# Patient Record
Sex: MALE | Race: BLACK OR AFRICAN AMERICAN | Employment: FULL TIME | URBAN - METROPOLITAN AREA
[De-identification: names, ages, dates, MRNs, and addresses within clinical notes are randomized per-mention and may not be internally consistent; named-entity substitution may affect disease eponyms.]

---

## 2021-08-26 ENCOUNTER — OFFICE VISIT (OUTPATIENT)
Dept: ORTHOPEDIC SURGERY | Age: 36
End: 2021-08-26
Payer: COMMERCIAL

## 2021-08-26 VITALS
OXYGEN SATURATION: 98 % | HEIGHT: 72 IN | BODY MASS INDEX: 37.22 KG/M2 | TEMPERATURE: 97.7 F | HEART RATE: 80 BPM | WEIGHT: 274.8 LBS

## 2021-08-26 DIAGNOSIS — S63.502A LEFT WRIST SPRAIN, INITIAL ENCOUNTER: Primary | ICD-10-CM

## 2021-08-26 DIAGNOSIS — M77.8 TRICEPS TENDONITIS: ICD-10-CM

## 2021-08-26 DIAGNOSIS — S63.501A RIGHT WRIST SPRAIN, INITIAL ENCOUNTER: ICD-10-CM

## 2021-08-26 PROCEDURE — 73080 X-RAY EXAM OF ELBOW: CPT | Performed by: ORTHOPAEDIC SURGERY

## 2021-08-26 PROCEDURE — 73110 X-RAY EXAM OF WRIST: CPT | Performed by: ORTHOPAEDIC SURGERY

## 2021-08-26 PROCEDURE — 99203 OFFICE O/P NEW LOW 30 MIN: CPT | Performed by: ORTHOPAEDIC SURGERY

## 2021-08-26 NOTE — PROGRESS NOTES
Earl Madison is a 39 y.o. male right handed indeterminate. Worker's Compensation and legal considerations: none filed. Vitals:    08/26/21 0756   Pulse: 80   Temp: 97.7 °F (36.5 °C)   TempSrc: Skin   SpO2: 98%   Weight: 274 lb 12.8 oz (124.6 kg)   Height: 6' (1.829 m)   PainSc:   2   PainLoc: Wrist           Chief Complaint   Patient presents with    Elbow Pain     right    Wrist Pain     bilateral         HPI: Patient presents today with complaints of bilateral wrist pain left worse than right and right elbow pain. He reports these are exacerbated when lifting weights. Date of onset: Early 2021    Injury: No    Prior Treatment:  No    Numbness/ Tingling: No      ROS: Review of Systems - General ROS: negative  Psychological ROS: negative  ENT ROS: negative  Allergy and Immunology ROS: negative  Hematological and Lymphatic ROS: negative  Respiratory ROS: no cough, shortness of breath, or wheezing  Cardiovascular ROS: no chest pain or dyspnea on exertion  Gastrointestinal ROS: no abdominal pain, change in bowel habits, or black or bloody stools  Musculoskeletal ROS: negative  Neurological ROS: negative  Dermatological ROS: negative    Past Medical History:   Diagnosis Date    Azoospermia     Benign hypertension     Bilateral wrist pain     Hypertension 2010    Hypogonadism in male     Ill-defined condition     back injury May 30, 2015    [facility] 8-    BMI 56.9    Right elbow pain     Vitamin D deficiency        Past Surgical History:   Procedure Laterality Date    HX GASTRECTOMY      HX ORTHOPAEDIC Left 2013    ring finger    HX ORTHOPAEDIC      herniated disk        Current Outpatient Medications   Medication Sig Dispense Refill    clomiPHENE (CLOMID) 50 mg tablet 1 TABLET BY MOUTH DAILY      chorionic gonadotropin, human (NovareL) 5,000 unit solr 5,000 Units by IntraMUSCular route two (2) times a week.       hydrochlorothiazide (HYDRODIURIL) 25 mg tablet Take 25 mg by mouth daily. Pt instructed to take am of surgery. Indications: HYPERTENSION         No Known Allergies        PE:     Physical Exam  Vitals and nursing note reviewed. Constitutional:       General: He is not in acute distress. Appearance: Normal appearance. He is not ill-appearing. Cardiovascular:      Pulses: Normal pulses. Pulmonary:      Effort: Pulmonary effort is normal. No respiratory distress. Musculoskeletal:         General: Tenderness present. No swelling, deformity or signs of injury. Normal range of motion. Cervical back: Normal range of motion and neck supple. Right lower leg: No edema. Left lower leg: No edema. Skin:     General: Skin is warm and dry. Capillary Refill: Capillary refill takes less than 2 seconds. Findings: No bruising or erythema. Neurological:      General: No focal deficit present. Mental Status: He is alert and oriented to person, place, and time. Psychiatric:         Mood and Affect: Mood normal.         Behavior: Behavior normal.          Right elbow: There is tenderness to palpation along the triceps insertion dorsally. This is exacerbated by active resisted motion. Wrist:    Tenderness L R Test L R   1st Ext Comp - - Finkelstein's - -   Snuff Box - - Bailey - -   2nd Ext Comp - - S-L Shear - -   S-L Joint - - L-T Shear - -   L-T Joint - - DRUJ Sup + +   6th Ext Comp - - DRUJ Pro + +   Ulnar Snuff + + DRUJ Grind - -   Fovea - - TFCC - -   STT Joint - - Mid-Carp Inst - -   FCR - - P-T Grind - -   Intersection - - ECU Sublux. - -      Dorsal Ganglion: -   Volar Ganglion: -      ROM: Full        Imagin2021 3 views of bilateral wrist does not show any fracture dislocation or any other osseous abnormalities. 21 3 views of right elbow negative for fracture, dislocation, or any other osseous abnormalities. ICD-10-CM ICD-9-CM    1.  Left wrist sprain, initial encounter  S63.502A 842.00 AMB POC XRAY, WRIST; COMPLETE, 3+ VIE      AMB SUPPLY ORDER      MRI WRIST LT WO CONT   2. Right wrist sprain, initial encounter  S63.501A 842.00 AMB POC XRAY, WRIST; COMPLETE, 3+ VIE      AMB SUPPLY ORDER   3. Triceps tendonitis  M77.8 726.39 AMB POC XRAY, ELBOW; COMPLETE, 3+ VIE         Plan:     MRI of left wrist without contrast to rule out soft tissue injury or other internal derangement. Bilateral wrist braces to be worn whenever active especially during weightlifting. Instructed the patient in the importance of rest that he is likely suffering from some overuse at his right elbow and bilateral wrists. Follow-up and Dispositions    · Return for MRI review.           Plan was reviewed with patient, who verbalized agreement and understanding of the plan

## 2021-09-30 ENCOUNTER — HOSPITAL ENCOUNTER (OUTPATIENT)
Dept: MRI IMAGING | Age: 36
Discharge: HOME | End: 2021-09-30
Attending: ORTHOPAEDIC SURGERY
Payer: COMMERCIAL

## 2021-09-30 DIAGNOSIS — S63.502A LEFT WRIST SPRAIN, INITIAL ENCOUNTER: ICD-10-CM

## 2021-09-30 PROCEDURE — 73221 MRI JOINT UPR EXTREM W/O DYE: CPT

## 2021-10-06 PROBLEM — B37.2 CANDIDAL INTERTRIGO: Status: ACTIVE | Noted: 2018-03-11

## 2021-10-06 PROBLEM — Z90.3 HISTORY OF SLEEVE GASTRECTOMY: Status: ACTIVE | Noted: 2017-01-20

## 2021-10-06 PROBLEM — I10 BENIGN HYPERTENSION: Status: ACTIVE | Noted: 2020-01-09

## 2021-10-06 PROBLEM — E65: Status: ACTIVE | Noted: 2018-03-09

## 2021-10-06 PROBLEM — E66.9 CLASS 2 OBESITY WITHOUT SERIOUS COMORBIDITY WITH BODY MASS INDEX (BMI) OF 35.0 TO 35.9 IN ADULT: Status: ACTIVE | Noted: 2020-01-09

## 2021-10-21 ENCOUNTER — OFFICE VISIT (OUTPATIENT)
Dept: ORTHOPEDIC SURGERY | Age: 36
End: 2021-10-21
Payer: COMMERCIAL

## 2021-10-21 VITALS
TEMPERATURE: 97.7 F | WEIGHT: 274 LBS | BODY MASS INDEX: 38.36 KG/M2 | HEIGHT: 71 IN | HEART RATE: 74 BPM | OXYGEN SATURATION: 97 %

## 2021-10-21 DIAGNOSIS — S63.502D LEFT WRIST SPRAIN, SUBSEQUENT ENCOUNTER: Primary | ICD-10-CM

## 2021-10-21 DIAGNOSIS — S63.501D RIGHT WRIST SPRAIN, SUBSEQUENT ENCOUNTER: ICD-10-CM

## 2021-10-21 PROCEDURE — 99214 OFFICE O/P EST MOD 30 MIN: CPT | Performed by: ORTHOPAEDIC SURGERY

## 2021-10-21 NOTE — PROGRESS NOTES
Melinda Ramires is a 39 y.o. male right handed indeterminate. Worker's Compensation and legal considerations: none filed. Vitals:    10/21/21 0908   Pulse: 74   Temp: 97.7 °F (36.5 °C)   TempSrc: Temporal   SpO2: 97%   Weight: 274 lb (124.3 kg)   Height: 5' 11\" (1.803 m)   PainSc:   0 - No pain   PainLoc: Wrist           Chief Complaint   Patient presents with    Wrist Pain     left wrist       HPI: Patient presents today for MRI follow-up. He reports his pain is significantly improved if not completely resolved since he is taken some time off from the gym. Initial HPI: Patient presents today with complaints of bilateral wrist pain left worse than right and right elbow pain. He reports these are exacerbated when lifting weights. Date of onset: Early 2021    Injury: No    Prior Treatment:  No    Numbness/ Tingling: No      ROS: Review of Systems - General ROS: negative  Psychological ROS: negative  ENT ROS: negative  Allergy and Immunology ROS: negative  Hematological and Lymphatic ROS: negative  Respiratory ROS: no cough, shortness of breath, or wheezing  Cardiovascular ROS: no chest pain or dyspnea on exertion  Gastrointestinal ROS: no abdominal pain, change in bowel habits, or black or bloody stools  Musculoskeletal ROS: negative  Neurological ROS: negative  Dermatological ROS: negative    Past Medical History:   Diagnosis Date    Azoospermia     Benign hypertension     Bilateral wrist pain     Hypertension 2010    Hypogonadism in male     Ill-defined condition     back injury May 30, 2015    [facility]bid obesity ([facility] 8-    BMI 56.9    Right elbow pain     Vitamin D deficiency        Past Surgical History:   Procedure Laterality Date    HX GASTRECTOMY      HX ORTHOPAEDIC Left 2013    ring finger    HX ORTHOPAEDIC      herniated disk        Current Outpatient Medications   Medication Sig Dispense Refill    hydrochlorothiazide (HYDRODIURIL) 25 mg tablet Take 25 mg by mouth daily.  Pt instructed to take am of surgery. Indications: HYPERTENSION         No Known Allergies        PE:     Physical Exam  Vitals and nursing note reviewed. Constitutional:       General: He is not in acute distress. Appearance: Normal appearance. He is not ill-appearing. Cardiovascular:      Pulses: Normal pulses. Pulmonary:      Effort: Pulmonary effort is normal. No respiratory distress. Musculoskeletal:         General: No swelling, tenderness, deformity or signs of injury. Normal range of motion. Cervical back: Normal range of motion and neck supple. Right lower leg: No edema. Left lower leg: No edema. Skin:     General: Skin is warm and dry. Capillary Refill: Capillary refill takes less than 2 seconds. Findings: No bruising or erythema. Neurological:      General: No focal deficit present. Mental Status: He is alert and oriented to person, place, and time. Psychiatric:         Mood and Affect: Mood normal.         Behavior: Behavior normal.          Right elbow: No tenderness to palpation today. Wrist:    Tenderness L R Test L R   1st Ext Comp - - Finkelstein's - -   Snuff Box - - Bailey - -   2nd Ext Comp - - S-L Shear - -   S-L Joint - - L-T Shear - -   L-T Joint - - DRUJ Sup - -   6th Ext Comp - - DRUJ Pro - -   Ulnar Snuff - - DRUJ Grind - -   Fovea - - TFCC - -   STT Joint - - Mid-Carp Inst - -   FCR - - P-T Grind - -   Intersection - - ECU Sublux. - -      Dorsal Ganglion: -   Volar Ganglion: -      ROM: Full        Imaging:       MRI of left wrist without contrast    IMPRESSION  1. No evidence of fracture, stress fracture, or bone marrow stress reaction  involving the left wrist.     2. MRI findings in keeping with a small slitlike perforation of the TFC, the  imaging appearance of which favors a Simmons 1A tear morphology. > Mild lateral subluxation of the ECU from the ulnar styloid recess noted,  potentially related to subsheath rupture.      > Moderate size joint effusion/synovitis at the distal radial ulnar joint. 8/26/2021 3 views of bilateral wrist does not show any fracture dislocation or any other osseous abnormalities. 8/26/21 3 views of right elbow negative for fracture, dislocation, or any other osseous abnormalities. ICD-10-CM ICD-9-CM    1. Left wrist sprain, subsequent encounter  S63.502D V58.89      842.00    2. Right wrist sprain, subsequent encounter  S63.501D V58.89      842.00          Plan:     As his symptoms have almost completely resolved with rest I have told him to just be mindful of his activities in the gym and possibly to get a wrist guard whenever lifting heavy weights. Follow-up and Dispositions    · Return if symptoms worsen or fail to improve.           Plan was reviewed with patient, who verbalized agreement and understanding of the plan

## 2021-12-15 ENCOUNTER — OFFICE VISIT (OUTPATIENT)
Dept: ORTHOPEDIC SURGERY | Age: 36
End: 2021-12-15
Payer: COMMERCIAL

## 2021-12-15 VITALS
HEART RATE: 66 BPM | TEMPERATURE: 97.8 F | WEIGHT: 286 LBS | OXYGEN SATURATION: 99 % | BODY MASS INDEX: 40.04 KG/M2 | HEIGHT: 71 IN

## 2021-12-15 DIAGNOSIS — M25.521 ELBOW PAIN, RIGHT: ICD-10-CM

## 2021-12-15 DIAGNOSIS — M75.52 CHRONIC BURSITIS OF LEFT SHOULDER: ICD-10-CM

## 2021-12-15 DIAGNOSIS — M25.512 CHRONIC LEFT SHOULDER PAIN: ICD-10-CM

## 2021-12-15 DIAGNOSIS — M19.011 PRIMARY OSTEOARTHRITIS, RIGHT SHOULDER: ICD-10-CM

## 2021-12-15 DIAGNOSIS — G89.29 CHRONIC LEFT SHOULDER PAIN: ICD-10-CM

## 2021-12-15 DIAGNOSIS — M25.511 CHRONIC RIGHT SHOULDER PAIN: Primary | ICD-10-CM

## 2021-12-15 DIAGNOSIS — M70.21 OLECRANON BURSITIS OF RIGHT ELBOW: ICD-10-CM

## 2021-12-15 DIAGNOSIS — M75.51 CHRONIC BURSITIS OF RIGHT SHOULDER: ICD-10-CM

## 2021-12-15 DIAGNOSIS — G89.29 CHRONIC RIGHT SHOULDER PAIN: Primary | ICD-10-CM

## 2021-12-15 PROCEDURE — 99214 OFFICE O/P EST MOD 30 MIN: CPT | Performed by: SPECIALIST

## 2021-12-15 PROCEDURE — 73030 X-RAY EXAM OF SHOULDER: CPT | Performed by: SPECIALIST

## 2021-12-15 RX ORDER — DICLOFENAC SODIUM 10 MG/G
4 GEL TOPICAL 4 TIMES DAILY
Qty: 5 EACH | Refills: 5 | Status: SHIPPED | OUTPATIENT
Start: 2021-12-15

## 2021-12-15 NOTE — PROGRESS NOTES
Patient: Addie Painting                MRN: [id number]       SSN: [id number]  YOB: 1985        AGE: 39 y.o. SEX: male    PCP: Jasson Elaine MD  12/15/21    CC: BILATERAL SHOULDER AND RIGHT ELBOW PAIN    HISTORY:  Addie Painting is a 39 y.o. male who is seen for bilateral shoulder and right elbow pain. He has been experiencing shoulder R>L pain for the past several months. He does not recall any injury. He notes increased shoulder pain when lifting weights. He experiences pain especially with bench press. He bench presses 290 pounds. He had to stop lifting recently due to severe pain. He feels shoulder pain with overhead activities and at night. He is right handed. He is also seen for right elbow pain. He does not recall any injury. He has lateral elbow pain with lifting and gripping. Pain Assessment  12/15/2021   Location of Pain Elbow; Shoulder   Location Modifiers Right;Left   Severity of Pain 2   Quality of Pain Sharp; Aching   Duration of Pain -   Duration of Pain Comment -   Frequency of Pain Intermittent   Frequency of Pain Comment -   Date Pain First Started (No Data)   Date Pain First Started Comment 5 months ago   Aggravating Factors Bending;Exercise   Aggravating Factors Comment -   Limiting Behavior Yes   Relieving Factors Nothing   Result of Injury No     Occupation, etc:  Mr. Herber Zavaleta works as a  for the [employer]. He works remotely. He lives with his wife in [address] [place] [address]. Mr. Herber Zavaleta weighs 286 lbs and is 5'11\" tall.        No results found for: HBA1C, FXK1ALYV, GIL7YATI, ZLS2TOTT  Weight Metrics 12/15/2021 10/21/2021 10/6/2021 8/26/2021 7/27/2021 8/26/2015 8/13/2015   Weight 286 lb 274 lb 270 lb 274 lb 12.8 oz 265 lb 410 lb 12.8 oz 419 lb   BMI 39.89 kg/m2 38.22 kg/m2 37.66 kg/m2 37.27 kg/m2 35.94 kg/m2 55.7 kg/m2 56.81 kg/m2       Patient Active Problem List   Diagnosis Code    Vitamin D deficiency E55.9    Panniculus adiposus E65    History of sleeve gastrectomy Z90.3    Class 2 obesity without serious comorbidity with body mass index (BMI) of 35.0 to 35.9 in adult E66.9, Z68.35    Benign hypertension I10    Candidal intertrigo B37.2     REVIEW OF SYSTEMS:    Constitutional Symptoms: Negative   Eyes: Negative   Ears, Nose, Throat and Mouth: Negative   Cardiovascular: Negative   Respiratory: Negative   Genitourinary: Per HPI   Gastrointestinal: Per HPI   Integumentary (Skin and/or Breast): Negative   Musculoskeletal: Per HPI   Endocrine/Rheumatologic: Negative   Neurological: Per HPI   Hematology/Lymphatic: Negative    Allergic/Immunologic: Negative   Phychiatric: Negative    Social History     Socioeconomic History    Marital status:      Spouse name: Not on file    Number of children: Not on file    Years of education: Not on file    Highest education level: Not on file   Occupational History    Not on file   Tobacco Use    Smoking status: Former Smoker     Quit date: 12/26/2017     Years since quitting: 3.9    Smokeless tobacco: Never Used   Vaping Use    Vaping Use: Not on file   Substance and Sexual Activity    Alcohol use: Yes     Alcohol/week: 6.0 standard drinks     Types: 6 Cans of beer per week    Drug use: No    Sexual activity: Never   Other Topics Concern    Not on file   Social History Narrative    Not on file     Social Determinants of Health     Financial Resource Strain:     Difficulty of Paying Living Expenses: Not on file   Food Insecurity:     Worried About Running Out of Food in the Last Year: Not on file    [place] of Food in the Last Year: Not on file   Transportation Needs:     Lack of Transportation (Medical): Not on file    Lack of Transportation (Non-Medical):  Not on file   Physical Activity:     Days of Exercise per Week: Not on file    Minutes of Exercise per Session: Not on file   Stress:     Feeling of Stress : Not on file   Social Connections:     Frequency of Communication with Friends and Family: Not on file    Frequency of Social Gatherings with Friends and Family: Not on file    Attends Buddhism Services: Not on file    Active Member of Clubs or Organizations: Not on file    Attends Club or Organization Meetings: Not on file    Marital Status: Not on file   Intimate Partner Violence:     Fear of Current or Ex-Partner: Not on file    Emotionally Abused: Not on file    Physically Abused: Not on file    Sexually Abused: Not on file   Housing Stability:     Unable to Pay for Housing in the Last Year: Not on file    Number of [place] in the Last Year: Not on file    Unstable Housing in the Last Year: Not on file      No Known Allergies   Current Outpatient Medications   Medication Sig    diclofenac (VOLTAREN) 1 % gel Apply 4 g to affected area four (4) times daily.  hydrochlorothiazide (HYDRODIURIL) 25 mg tablet Take 25 mg by mouth daily. Pt instructed to take am of surgery. Indications: HYPERTENSION     No current facility-administered medications for this visit. PHYSICAL EXAMINATION:  Visit Vitals  Pulse 66   Temp 97.8 °F (36.6 °C) (Temporal)   Ht 5' 11\" (1.803 m)   Wt 286 lb (129.7 kg)   SpO2 99%   BMI 39.89 kg/m²    Appearance: Alert, well appearing, obese, mesomorphic pleasant patient who is in no distress, oriented to person, place/time, and who follows commands. HEENT: [facility] hears well, does not require hearing aids. His sclera of the eyes are non-icteric. He is breathing normally and no respiratory accessory muscle use is noted. No JVD present and Neck ROM within normal limits. Psychiatric: Affect and mood are appropriate. Oriented x3  Cardiovascular/Peripheral Vascular: Normal pulses to each foot. Integumentary: No rashes. Warm and normal color. No drainage.    Gait: normal  Sensory Exam: Intact/Normal Sensation    Lymphatic: No evidence of Lymphedema  Vascular:       Pulses: palpable  Varicosities none  Wounds/Abrasion: None Present  Neuro: Negative, no tremors  ORTHO EXAMINATION:  Examination Right shoulder Left shoulder   Skin Intact Intact   Effusion - -   Biceps deformity - -   Atrophy - -   AC joint tenderness + -   Acromial tenderness - -   Biceps tenderness + -   Forward flexion/Elevation  170   Active abduction  170   External rotation ROM 10 30   Internal rotation  100   Apprehension - -   Impingement - -   Drop Arm Test - -   Neurovascular Intact Intact      Examination Right Elbow Left Elbow   Skin Intact Intact   Range of Motion 135-0 135-0   Tenderness + olecranon -   Swelling - -   Bruising - -   Stability Normal Normal   Motor Strength  Normal Normal   Neurovascular Intact Intact       MRI LEFT WRIST 9/30/21  IMPRESSION  1. No evidence of fracture, stress fracture, or bone marrow stress reaction involving the left wrist.   2. MRI findings in keeping with a small slitlike perforation of the TFC, the  imaging appearance of which favors a Simmons 1A tear morphology. > Mild lateral subluxation of the ECU from the ulnar styloid recess noted,  potentially related to subsheath rupture.     > Moderate size joint effusion/synovitis at the distal radial ulnar joint. RADIOGRAPHS:  XR BILAT SHOULDER 12/15/21 FRANNY  IMPRESSION:  Three views - No fractures, right moderate acromioclavicular narrowing, no glenohumeral narrowing, no calcific densities. IMPRESSION:      ICD-10-CM ICD-9-CM    1. Chronic right shoulder pain  M25.511 719.41 AMB POC XRAY, SHOULDER; COMPLETE, 2+    G89.29 338.29 diclofenac (VOLTAREN) 1 % gel      REFERRAL TO PHYSICAL THERAPY   2.  Chronic bursitis of right shoulder  M75.51 726.10 diclofenac (VOLTAREN) 1 % gel      REFERRAL TO PHYSICAL THERAPY   3. Primary osteoarthritis, right shoulder  M19.011 715.11 diclofenac (VOLTAREN) 1 % gel      REFERRAL TO PHYSICAL THERAPY   4. Chronic bursitis of left shoulder  M75.52 726.10 diclofenac (VOLTAREN) 1 % gel      REFERRAL TO PHYSICAL THERAPY 5. Chronic left shoulder pain  M25.512 719.41 AMB POC XRAY, SHOULDER; COMPLETE, 2+    G89.29 338.29 diclofenac (VOLTAREN) 1 % gel      REFERRAL TO PHYSICAL THERAPY   6. Olecranon bursitis of right elbow  M70.21 726.33 diclofenac (VOLTAREN) 1 % gel      REFERRAL TO PHYSICAL THERAPY   7. Elbow pain, right  M25.521 719.42 diclofenac (VOLTAREN) 1 % gel      REFERRAL TO PHYSICAL THERAPY     PLAN:  There is no need for surgery or injection at this time. Voltaren Gel Rx provided. He will start a brief course of outpatient physical therapy. We discussed the possibility of a right shoulder MR arthrogram at some point  in the future if pain continues. He will follow up as needed.       Scribed by Harsh Sevilla MD [facility]) as dictated by Harsh Sevilla MD

## 2021-12-20 ENCOUNTER — TELEPHONE (OUTPATIENT)
Dept: ORTHOPEDIC SURGERY | Age: 36
End: 2021-12-20

## 2021-12-20 NOTE — TELEPHONE ENCOUNTER
[facility] - Kunal Zamudio called requesting a referral be faxed to them for this patient, but the referral in the system is closed. We can fax a new referral to them at fax number [phone]. Please advise.

## 2021-12-21 ENCOUNTER — HOSPITAL ENCOUNTER (OUTPATIENT)
Dept: PHYSICAL THERAPY | Age: 36
Discharge: HOME | End: 2021-12-21
Payer: COMMERCIAL

## 2021-12-21 PROCEDURE — 97110 THERAPEUTIC EXERCISES: CPT

## 2021-12-21 PROCEDURE — 97161 PT EVAL LOW COMPLEX 20 MIN: CPT

## 2021-12-21 NOTE — PROGRESS NOTES
PT DAILY TREATMENT NOTE/SHOULDER EVAL 10-18    Patient Name: Timmy Bueno  Date:2021  : 1985  [x]  Patient  Verified  Payor: Sushma Clements / Plan: Cloretta Reges PPO / Product Type: PPO /    In time:10:15  Out time:11:05  Total Treatment Time (min): 50  Visit #: 1 of 12    Medicare/BCBS Only   Total Timed Codes (min):  23 1:1 Treatment Time:  50       Treatment Area: Right shoulder pain [M25.511]  Left shoulder pain [M25.512]    SUBJECTIVE  Pain Level (0-10 scale): 1/10  [x]constant []intermittent [x]improving []worsening []no change since onset  Any medication changes, allergies to medications, adverse drug reactions, diagnosis change, or new procedure performed?: [x] No    [] Yes (see summary sheet for update)  Subjective functional status/changes:   HPI: Pt reports his right and left shoulders started hurting month ago. Pt reports he hasn't lifted anything in a month. Pt reports he started to increase the load over the last few months. Pt the bench press with dumbbells and overhead press single handed anything over 75 lbs. Pt reports he doesn't have pain unless he does that lifting. Pt's last day of lifting was . Pt reports the right hurts more than the left and pt is right handed. Pt reports the right elbow has also been hurting and that hurts with curls and pushing overhead activities, but once he gets warmed up it is okay but then after it hurts. Pain description:    [x]Constant []Intermittent   Limitations to PLOF: unable to perform the weight lifting he wants to do at the weight he wants to do it at. Mechanism of Injury: weight training, progression. Current symptoms/Complaints: 1/10 at best with rest; 10/10 at worst with lifting single handed lifting over head or bench press dumbbells, move it certain ways; pt reports they are able to sleep through the night secondary to pain  Pain since onset: [x]Better- due to not lifting for a month.    Previous Treatment/Compliance: none, just X-rays. Comorbidities/PMHx/Surgical Hx: retrains water and takes a water pill for that. Prior level of function: functionally independent, no AD, active lifestyle, goes to gym at least 4 times a week. 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 Living Situation: lives with wife. Pt Goals: \"to be able to cope with this and be able to get back to lifting \"  Barriers: []pain []financial []time []transportation []other  Motivation: very motivated       OBJECTIVE      27 min [x]Eval                  []Re-Eval       23 min Therapeutic Exercise:  [] See flow sheet :   Rationale: increase strength, improve coordination and increase proprioception to improve the patients ability to To increase patient's ease with performing functional activities and decrease overall pain and symptoms. With   [x] TE   [] TA   [] neuro   [] other: Patient Education: [x] Review HEP    [] Progressed/Changed HEP based on:   [] positioning   [] body mechanics   [] transfers   [] heat/ice application    [] other:        General Evaluation  Observation: Pt enters gym in no apparent distress. Posture:  pt with increased thoracic kyphosis and forward head posture. Palpation/Sensation: pt was not tender to palpation over bilateral shoulders or right elbow/olecranon. ROM:(degrees)               AROM    PROM   Shoulder Left Right Left Right   Flexion 165 170     Extension nt nt      175     ER T3 T4     IR T8 T8               Strength (MMT):5/5 with exceptions indicated below.    Shoulder L (1-5) R (1-5)   Shoulder Flexion 5- 5-   Shoulder Ext     Shoulder ABD     Shoulder ADD     Shoulder IR     Shoulder ER 5- 5-   Elbow Flexion     Elbow Extension     Wrist Flexion     Wrist Extension                 Parascapular L (1-5) R (1-5)   Lower trap 3+ pain 3+ pain   Middle trap 3+ pain 3+   Upper trap 5 4+                         Special Tests:  Shoulder SAPS cluster Left Right   Medina-Attila + +   Neer - -   Empty Can - -   Painful arch + +   Resisted external rotation + +     Other special tests:  Lateral epicondylitis tests: negative bilaterally. Grind/clank/clunk: negative for clicking, catching or popping    Functional Mobility      Bed Mobility:      Scooting:independent        Rolling:independent       Sit-Supine:independent      Transfers:       Sit-Stand:independent       Floor-Stand: independent      Other Tests / Comments: Pt was educated to watch how he was sitting in his chair to not rest his right elbow on it. Sensation: intact to light touch       Pain Level (0-10 scale) post treatment: 1/10    ASSESSMENT/Changes in Function: Patient is a 40 yo male who [facility] to [facility] with c/o bilateral shoulder pain and right elbow pain. Patient s/s are consistent with bilateral rotator cuff pain syndrome. Rotator cuff irritation was most likely caused by high weight overhead lifting with decreased parascapular stability. Patient also most likely had right triceps tendonitis, which has calmed down at this time due to patient not lifting for a month. Patient's right elbow pain could also be positional as patient props his elbows up on his chair at his desk. Patient demonstrates decreased ROM, decreased strength, decreased parascapular stability, impaired posture and pain which limites ease with overhead lifting activities. Patient would benefit form skilled physical therapy to address the above limitations and progress back to prior level of function. Patient will continue to benefit from skilled PT services to modify and progress therapeutic interventions, address functional mobility deficits, address ROM deficits, address strength deficits, analyze and address soft tissue restrictions, analyze and cue movement patterns, analyze and modify body mechanics/ergonomics and assess and modify postural abnormalities to attain remaining goals.      [x]  See Plan of Care  []  See progress note/recertification  []  See Discharge Summary    Evaluation Complexity History MEDIUM  Complexity : 1-2 comorbidities / personal factors will impact the outcome/ POC ; Examination MEDIUM Complexity : 3 Standardized tests and measures addressing body structure, function, activity limitation and / or participation in recreation  ;Presentation MEDIUM Complexity : Evolving with changing characteristics  ; Clinical Decision Making LOW Complexity : FOTO score of   Overall Complexity Rating: LOW   Problem List: pain affecting function, decrease ROM, decrease strength, decrease ADL/ functional abilitiies and decrease activity tolerance   Treatment Plan may include any combination of the following: Therapeutic exercise, Therapeutic activities, Neuromuscular re-education, Manual therapy, Patient education, Self Care training and Functional mobility training  Patient / Family readiness to learn indicated by: asking questions, trying to perform skills and interest  Persons(s) to be included in education: patient (P)  Barriers to Learning/Limitations: None  Patient Goal (s): to be able to cope with this and be able to get back to lifting  Patient Self Reported Health Status: good  Rehabilitation Potential: good  Progress towards goals / Updated goals:         Progress towards goals / Updated goals:  Short Term Goals: To be accomplished in 2 weeks: 1. Patient will report compliance with HEP at least 1x/day to aid in rehabilitation program.   Status at IE: pt was given at evaluation. Current: Same as IE      Long Term Goals: To be accomplished in 6 weeks: 1. Patient will report compliance with HEP a least 3-4x/week to aid in rehabilitation/strengthening program.   Status at IE: pt was given at evaluation. Current: Same as IE    2. Patient will report no pain greater than 1-2/10 with overhead lifting activities in order to progress back to prior level of function.     Status at IE: pain at worst 10/10 with lifting activities. Current: Same as IE    3. Patient will increase flower. UE strength to at least 4/5 throughout all planes to aid in stabilization with overhead lifting activities. Status at IE:     Parascapular L (1-5) R (1-5)   Lower trap 3+ pain 3+ pain   Middle trap 3+ pain 3+   Upper trap 5 4+      Current: Same as IE    4. Patient will increase FOTO score to 84 points overall to demonstrate improvement in functional status.     Status at IE: 80   Current: Same as IE     *FOTO score is an established functional score where 100 = no disability*         PLAN  [x]  Upgrade activities as tolerated     [x]  Continue plan of care  [x]  Update interventions per flow sheet       []  Discharge due to:_  []  Other:_      Brandy Guardado, PT 12/21/2021  10:03 AM

## 2021-12-21 NOTE — PROGRESS NOTES
[facility]  [address], [address] [facility] [place]  Ph [phone]  Fx [phone]    Plan of Care/ Statement of Necessity for Physical Therapy Services    Patient name: Felipa Betancur [facility] of Care: 2021   Referral source: Katy Buchanan MD : 1985    Medical Diagnosis: Right shoulder pain [M25.511]  Left shoulder pain [M25.512]   Onset Date:10/21   Treatment Diagnosis: right and left shoulder pain, right elbow pain. ICD-10: Z73.518, M25.512   Prior Hospitalization: see medical history Provider#: [id number]   Medications: Verified on Patient summary List   Comorbidities/PMHx/Surgical Hx: retrains water and takes a water pill for that. Prior level of function: functionally independent, no AD, active lifestyle, goes to gym at least 4 times a week. The Plan of Care and following information is based on the information from the initial evaluation. Assessment/ key information: Patient is a 40 yo male who presents to [facility] with c/o bilateral shoulder pain and right elbow pain. Patient s/s are consistent with bilateral rotator cuff pain syndrome. Rotator cuff irritation was most likely caused by high weight overhead lifting with decreased parascapular stability. Patient also most likely had right triceps tendonitis, which has calmed down at this time due to patient not lifting for a month. Patient's right elbow pain could also be positional as patient props his elbows up on his chair at his desk. Patient demonstrates decreased ROM, decreased strength, decreased parascapular stability, impaired posture and pain which limites ease with overhead lifting activities.  Patient would benefit form skilled physical therapy to address the above limitations and progress back to prior level of function.          Evaluation Complexity History MEDIUM  Complexity : 1-2 comorbidities / personal factors will impact the outcome/ POC ; Examination MEDIUM Complexity : 3 Standardized tests and measures addressing body structure, function, activity limitation and / or participation in recreation  ;Presentation MEDIUM Complexity : Evolving with changing characteristics  ; Clinical Decision Making LOW Complexity : FOTO score of   Overall Complexity Rating: LOW   Problem List: pain affecting function, decrease ROM, decrease strength, decrease ADL/ functional abilitiies and decrease activity tolerance       Treatment Plan may include any combination of the following: Therapeutic exercise, Therapeutic activities, Neuromuscular re-education, Manual therapy, Patient education, Self Care training and Functional mobility training  Patient / Family readiness to learn indicated by: asking questions, trying to perform skills and interest  Persons(s) to be included in education: patient (P)  Barriers to Learning/Limitations: None  Patient Goal (s): to be able to cope with this and be able to get back to lifting  Patient Self Reported Health Status: good  Rehabilitation Potential: good  Progress towards goals / Updated goals:         Progress towards goals / Updated goals:  Short Term Goals: To be accomplished in 2 weeks: 1. Patient will report compliance with HEP at least 1x/day to aid in rehabilitation program.              Status at IE: pt was given at evaluation. Current: Same as IE        Long Term Goals: To be accomplished in 6 weeks: 1. Patient will report compliance with HEP a least 3-4x/week to aid in rehabilitation/strengthening program.              Status at IE: pt was given at evaluation. Current: Same as IE     2.Patient will report no pain greater than 1-2/10 with overhead lifting activities in order to progress back to prior level of function. Status at IE: pain at worst 10/10 with lifting activities. Current: Same as IE     3. Patient will increase flower.  UE strength to at least 4/5 throughout all planes to aid in stabilization with overhead lifting activities. Status at IE:     Parascapular L (1-5) R (1-5)   Lower trap 3+ pain 3+ pain   Middle trap 3+ pain 3+   Upper trap 5 4+                  Current: Same as IE     4.Patient will increase FOTO score to 84 points overall to demonstrate improvement in functional status. Status at IE: 80              Current: Same as IE                 *FOTO score is an established functional score where 100 = no disability*           Frequency / Duration: Patient to be seen 2 times per week for 6 weeks. Patient/ Caregiver education and instruction: Diagnosis, prognosis, activity modification and exercises   [x]  Plan of care has been reviewed with PTA    Certification Period: SCAR Burgos, PT 12/21/2021 2:25 PM    ________________________________________________________________________    I certify that the above Therapy Services are being furnished while the patient is under my care. I agree with the treatment plan and certify that this therapy is necessary.     Physician's Signature:_____________________Date:____________TIME:________                                      Debi Kingsley MD      ** Signature, Date and Time must be completed for valid certification **  Please sign and return to [facility]  [address], [address] [facility]  Ph [phone]  Fx [phone]

## 2021-12-22 ENCOUNTER — APPOINTMENT (OUTPATIENT)
Dept: PHYSICAL THERAPY | Age: 36
End: 2021-12-22
Payer: COMMERCIAL

## 2021-12-23 ENCOUNTER — HOSPITAL ENCOUNTER (OUTPATIENT)
Dept: PHYSICAL THERAPY | Age: 36
Discharge: HOME | End: 2021-12-23
Payer: COMMERCIAL

## 2021-12-23 PROCEDURE — 97530 THERAPEUTIC ACTIVITIES: CPT

## 2021-12-23 PROCEDURE — 97110 THERAPEUTIC EXERCISES: CPT

## 2021-12-23 NOTE — PROGRESS NOTES
PT DAILY TREATMENT NOTE    Patient Name: Bg Abbott  Date:2021  : 1985  [x]  Patient  Verified  Payor: Usman Limb / Plan: Pelon Bright PPO / Product Type: PPO /    In time:758  Out time:842  Total Treatment Time (min): 44  Total Timed Codes (min): 40  1:1 Treatment Time (MC/BCBS only): 40  Visit #: 2  of 12    Treatment Dx: Right shoulder pain [M25.511]  Left shoulder pain [M25.512]    SUBJECTIVE  Pain Level (0-10 scale): 0/10  Any medication changes, allergies to medications, adverse drug reactions, diagnosis change, or new procedure performed?: [x] No    [] Yes (see summary sheet for update)  Subjective functional status/changes:   [] No changes reported  Patient reports no pain but has not tried to lift heavier weights 80 - 120#. OBJECTIVE      30 min Therapeutic Exercise:  [x] See flow sheet :   Rationale: increase ROM, increase strength and improve coordination to improve the patients ability to return to PLOF    10 min Therapeutic Activity:  [x]  See flow sheet :   Rationale: increase ROM, increase strength, improve coordination, improve balance and increase proprioception  to improve the patients ability to allow patient to perform activities with decreased discomfort. With   [x] TE   [] TA   [] neuro   [] other: Patient Education: [x] Review HEP    [] Progressed/Changed HEP based on:   [] positioning   [] body mechanics   [] transfers   [] heat/ice application    [] other:         Pain Level (0-10 scale) post treatment: 0/10    ASSESSMENT/Changes in Function: Patient tolerated treatment session well today. Patient had no complaints with addition of shoulder flexion, extension, rows,  chest press, and push ups while on BOSU to exercise program to accomplish increased core stability. Patient continues to make steady progress toward goals and would benefit from continued skilled PT intervention to address remaining deficits outlined in goals below.       Patient will continue to benefit from skilled PT services to modify and progress therapeutic interventions, address functional mobility deficits, address ROM deficits, address strength deficits, analyze and address soft tissue restrictions, analyze and cue movement patterns, analyze and modify body mechanics/ergonomics, assess and modify postural abnormalities and instruct in home and community integration to attain remaining goals. [x]  See Plan of Care  []  See progress note/recertification  []  See Discharge Summary         Progress towards goals / Updated goals:  Short Term Goals: To be accomplished in 2 weeks: 1. Patient will report compliance with HEP at least 1x/day to aid in rehabilitation program.              Status at IE: pt was given at evaluation. Current: Same as IE        Long Term Goals: To be accomplished in 6 weeks: 1. Patient will report compliance with HEP a least 3-4x/week to aid in rehabilitation/strengthening program.              Status at IE: pt was given at evaluation. Current: Same as IE     2.Patient will report no pain greater than 1-2/10 with overhead lifting activities in order to progress back to prior level of function. Status at IE: pain at worst 10/10 with lifting activities. Current: Same as IE                Current:  Pt reports no pain unless doing higher wt lifting 80 -120#  12/23/21     3. Patient will increase flower. UE strength to at least 4/5 throughout all planes to aid in stabilization with overhead lifting activities. Status at IE:     Parascapular L (1-5) R (1-5)   Lower trap 3+ pain 3+ pain   Middle trap 3+ pain 3+   Upper trap 5 4+                  Current: Same as IE     4.Patient will increase FOTO score to 84 points overall to demonstrate improvement in functional status.                Status at IE: 80              Current: Same as IE                 *FOTO score is an established functional score where 100 = no disability*               PLAN  []  Upgrade activities as tolerated     [x]  Continue plan of care  []  Update interventions per flow sheet       []  Discharge due to:_  []  Other:_      Guy Perez PT 12/23/2021  8:14 AM    Future Appointments   Date Time Provider Mandeep Petersen   1/4/2022  5:00 PM Deborrah Glow, [address] [facility]   1/6/2022  5:00 PM Deborrah Glow, [address] [facility]   1/11/2022  5:00 PM Deborrah Glow, [address] [facility]   1/13/2022  5:00 PM Deborrah Glow, PT [facility]   1/18/2022  5:00 PM Deborrah Glow, [address] [facility]   1/20/2022  5:00 PM Deborrah Glow, [address] [facility]   1/25/2022  5:00 PM Deborrah Glow, [address] [facility]   1/27/2022  5:00 PM Deborrah Glow, [address] [facility]

## 2022-01-04 ENCOUNTER — HOSPITAL ENCOUNTER (OUTPATIENT)
Dept: PHYSICAL THERAPY | Age: 37
Discharge: HOME | End: 2022-01-04
Payer: COMMERCIAL

## 2022-01-04 PROCEDURE — 97110 THERAPEUTIC EXERCISES: CPT

## 2022-01-04 PROCEDURE — 97530 THERAPEUTIC ACTIVITIES: CPT

## 2022-01-04 PROCEDURE — 97112 NEUROMUSCULAR REEDUCATION: CPT

## 2022-01-04 NOTE — PROGRESS NOTES
PT DAILY TREATMENT NOTE    Patient Name: Dalene Pallas  Date:2022  : 1985  [x]  Patient  Verified  Payor: Sarkis Kelsey / Plan: Orly Banks PPO / Product Type: PPO /    In time:505  Out time:554  Total Treatment Time (min): 49  Total Timed Codes (min): 49  1:1 Treatment Time (MC/BCBS only): 52   Visit #: 3 of 12    Treatment Dx: Right shoulder pain [M25.511]  Left shoulder pain [M25.512]    SUBJECTIVE  Pain Level (0-10 scale): 0/10  Any medication changes, allergies to medications, adverse drug reactions, diagnosis change, or new procedure performed?: [x] No    [] Yes (see summary sheet for update)  Subjective functional status/changes:   [] No changes reported  Pt reports that he will be attending to the gym tonight for the first time again. OBJECTIVE    15 min Therapeutic Exercise:  [x] See flow sheet :   Rationale: increase ROM, increase strength and improve coordination to improve the patients ability to restore PLOF    17 min Therapeutic Activity:  [x]  See flow sheet :   Rationale: increase ROM, increase strength, improve coordination and improve balance  to improve the patients ability to complete transfers and ADLs without external assist     17 min Neuromuscular Re-education:  [x]  See flow sheet :   Rationale: increase ROM, increase strength, improve coordination and improve balance  to improve the patients ability to             With   [x] TE   [x] TA   [x] neuro   [] other: Patient Education: [x] Review HEP    [x] Progressed/Changed HEP based on:   [x] positioning   [x] body mechanics   [] transfers   [] heat/ice application    [] other:         Pain Level (0-10 scale) post treatment: 0/10      ASSESSMENT/Changes in Function:    Patient tolerated treatment session well today. Patient had no complaints with addition of 10 lbs weight with exercise program to accomplish scapular strengthening and stabilization. Pt reports that he will be attending back to the [employer] tonight.  He reports of no pain post treatment today. PT gave ok for light weight for bench press, over head press and elbow curl tonight. Pt cont to need stabilization. Patient continues to make steady progress toward goals and would benefit from continued skilled PT intervention to address remaining deficits outlined in goals below. Patient will continue to benefit from skilled PT services to modify and progress therapeutic interventions, address functional mobility deficits, address ROM deficits, address strength deficits, analyze and address soft tissue restrictions, analyze and cue movement patterns, analyze and modify body mechanics/ergonomics and assess and modify postural abnormalities to attain remaining goals. [x]  See Plan of Care  []  See progress note/recertification  []  See Discharge Summary         Progress towards goals / Updated goals:    Short Term Goals: To be accomplished in 2 weeks: 1. Patient will report compliance with HEP at least 1x/day to aid in rehabilitation program.              Status at IE: pt was given at evaluation. Current: Same as IE 1/4/22        Long Term Goals: To be accomplished in 6 weeks: 1. Patient will report compliance with HEP a least 3-4x/week to aid in rehabilitation/strengthening program.              Status at IE: pt was given at evaluation. Current: Same as IE 1/4/22     2. Patient will report no pain greater than 1-2/10 with overhead lifting activities in order to progress back to prior level of function. Status at IE: pain at worst 10/10 with lifting activities. Current: Same as IE                Current:  Pt reports no pain unless doing higher wt lifting 80 -120#  12/23/21     3. Patient will increase flower. UE strength to at least 4/5 throughout all planes to aid in stabilization with overhead lifting activities.                Status at IE:     Parascapular L (1-5) R (1-5)   Lower trap 3+ pain 3+ pain   Middle trap 3+ pain 3+   Upper trap 5 4+                  Current: Same as IE   4. Patient will increase FOTO score to 84 points overall to demonstrate improvement in functional status.                Status at IE: 80              Current: Same as IE                 *FOTO score is an established functional score where 100 = no disability*             PLAN  []  Upgrade activities as tolerated     [x]  Continue plan of care  []  Update interventions per flow sheet       []  Discharge due to:_  []  Other:_      Josiane Canales, EMIGDIO 1/4/2022  4:56 PM    Signed By: Gertrudis Rodriguez, PT     January 4, 2022        Future Appointments   Date Time Provider Mandeep Petersen   1/4/2022  5:00 PM Tsohia Kedar, [address] [facility]   1/6/2022  5:00 PM Toshia Kedar, [address] [facility]   1/11/2022  5:00 PM Toshia Kedar, [address] [facility]   1/13/2022  5:00 PM Toshia Kedar, [address] [facility]   1/18/2022  5:00 PM Toshia Kedar, [address] [facility]   1/20/2022  5:00 PM Toshia Kedar, [address] [facility]   1/25/2022  5:00 PM Toshia Kedar, [address] [facility]   1/27/2022  5:00 PM Toshia Kedar, [address] [facility]

## 2022-01-04 NOTE — PROGRESS NOTES
[facility]  [address] [place], [address]  Ph [phone]  Fx [phone]    Physical Therapy Progress Note  Patient name: Anabella Mcgraw [facility] of Care: 2021   Referral source: Neal Medina MD : 1985                Medical Diagnosis: Right shoulder pain [M25.511]  Left shoulder pain [M25.512]    Onset Date:10/21   Treatment Diagnosis: right and left shoulder pain, right elbow pain. ICD-10: P36.130, M25.512   Prior Hospitalization: see medical history Provider#: [id number]   Medications: Verified on Patient summary List   Comorbidities/PMHx/Surgical Hx: retrains water and takes a water pill for that.   Prior level of function: functionally independent, no AD, active lifestyle, goes to gym at least 4 times a week. The Plan of Care and following information is based on the information from the initial evaluation. Visits from Start of Care: 3    Missed Visits: 0    Updated Goals/Measure of Progress: To be achieved in 5 weeks:    Short Term Goals: To be accomplished in 2 weeks: 1. Patient will report compliance with HEP at least 1x/day to aid in rehabilitation program.              Status at IE: pt was given at evaluation.               Current: Same as IE 22        Long Term Goals: To be accomplished in 6 weeks: 1. Patient will report compliance with HEP a least 3-4x/week to aid in rehabilitation/strengthening program.              MLRDUU at IE: pt was given at evaluation.               Current: Same as IE 22      2.Patient will report no pain greater than 1-2/10 with overhead lifting activities in order to progress back to prior level of function.               Status at IE: pain at worst 10/10 with lifting activities.                SUPAJNB: Same as IE                Current:  Pt reports no pain unless doing higher wt lifting 80 -120#  21      3.Patient will increase flower. UE strength to at least 4/5 throughout all planes to aid in stabilization with overhead lifting activities.                Status at IE:     Parascapular L (1-5) R (1-5)   Lower trap 3+ pain 3+ pain   Middle trap 3+ pain 3+   Upper trap 5 4+                  Current: Same as IE   4.Patient will increase FOTO score to 84 points overall to demonstrate improvement in functional status.             NYVKPW at IE: 80              Current: Same as IE                 *FOTO score is an established functional score where 100 = no disability*     Summary of Care/ Key Functional Changes: Patient tolerated treatment session well today. Patient had no complaints with addition of 10 lbs weight with exercise program to accomplish scapular strengthening and stabilization. Pt reports that he will be attending back to the gym tonight. He reports of no pain post treatment today. PT gave ok for light weight for bench press, over head press and elbow curl tonight. Pt cont to need stabilization. Patient continues to make steady progress toward goals and would benefit from continued skilled PT intervention to address remaining deficits outlined in goals below. Progress note performed due to start of new calender year.      Patient will continue to benefit from skilled PT services to modify and progress therapeutic interventions, address functional mobility deficits, address ROM deficits, address strength deficits, analyze and address soft tissue restrictions, analyze and cue movement patterns, analyze and modify body mechanics/ergonomics and assess and modify postural abnormalities to attain remaining goals.       ASSESSMENT/RECOMMENDATIONS:  [x]Continue therapy per initial plan/protocol at a frequency of  2 x per week for 5 weeks  []Continue therapy with the following recommended changes:_____________________ _____________________________ ________________________________________  []Discontinue therapy progressing towards or have reached established goals  []Discontinue therapy due to lack of appreciable progress towards goals  []Discontinue therapy due to lack of attendance or compliance  []Await Physician's recommendations/decisions regarding therapy  []Other:________________________________________________________________    Thank you for this referral.   Pebbles Matt, PT 1/4/2022 6:55 PM

## 2022-01-06 ENCOUNTER — HOSPITAL ENCOUNTER (OUTPATIENT)
Dept: PHYSICAL THERAPY | Age: 37
Discharge: HOME | End: 2022-01-06
Payer: COMMERCIAL

## 2022-01-06 PROCEDURE — 97530 THERAPEUTIC ACTIVITIES: CPT

## 2022-01-06 PROCEDURE — 97112 NEUROMUSCULAR REEDUCATION: CPT

## 2022-01-06 PROCEDURE — 97110 THERAPEUTIC EXERCISES: CPT

## 2022-01-06 NOTE — PROGRESS NOTES
PT DAILY TREATMENT NOTE    Patient Name: Rashida Louise  Date:2022  : 1985  [x]  Patient  Verified  Payor: Ivelisse Petersen / Plan: Telma Agustin PPO / Product Type: PPO /    In time:505  Out time:546  Total Treatment Time (min): 41  Total Timed Codes (min): 41  1:1 Treatment Time (MC/BCBS only): 41   Visit #: 4 of 12    Treatment Dx: Right shoulder pain [M25.511]  Left shoulder pain [M25.512]    SUBJECTIVE  Pain Level (0-10 scale): 0/10  Any medication changes, allergies to medications, adverse drug reactions, diagnosis change, or new procedure performed?: [x] No    [] Yes (see summary sheet for update)  Subjective functional status/changes:   [] No changes reported  Pt reports of no pain since last visit and plans on attending to the gym tonight. OBJECTIVE      15 min Therapeutic Exercise:  [x] See flow sheet :   Rationale: increase ROM, increase strength, improve coordination and improve balance to improve the patients ability to restore PLOF      15 min Therapeutic Activity:  [x]  See flow sheet :   Rationale: increase ROM, increase strength and improve coordination  to improve the patients ability to complete transfers and ADLs without external assist       11 min Neuromuscular Re-education:  [x]  See flow sheet :   Rationale: increase ROM, increase strength, improve coordination and improve balance  to improve the patients ability to active scapular and core muscles. With   [x] TE   [x] TA   [x] neuro   [] other: Patient Education: [x] Review HEP    [x] Progressed/Changed HEP based on:   [x] positioning   [x] body mechanics   [] transfers   [] heat/ice application    [] other:        Pain Level (0-10 scale) post treatment: 0/10    ASSESSMENT/Changes in Function:   Patient tolerated treatment session well today. Patient had no complaints with addition of body blade and shoulder abduction with theraband to exercise program to accomplish scapular strengthening muscles.  Encouraged pt to trial bench press and over head press using 20 lbs at gym tonight and see if any changes in pain. PT instructed that if no pain then he could increased by 5# at next gym visit. Patient continues to make steady progress toward goals and would benefit from continued skilled PT intervention to address remaining deficits outlined in goals below. Patient will continue to benefit from skilled PT services to modify and progress therapeutic interventions, address functional mobility deficits, address ROM deficits, address strength deficits, analyze and address soft tissue restrictions, analyze and cue movement patterns, analyze and modify body mechanics/ergonomics and assess and modify postural abnormalities to attain remaining goals. [x]  See Plan of Care  []  See progress note/recertification  []  See Discharge Summary         Progress towards goals / Updated goals:  Short Term Goals: To be accomplished in 2 weeks: 1. Patient will report compliance with HEP at least 1x/day to aid in rehabilitation program.              Status at IE: pt was given at evaluation. Current: Same as IE 1/4/22    Current: pt reports of engaging with HEP 2x a week. 1/6/22        Long Term Goals: To be accomplished in 6 weeks: 1. Patient will report compliance with HEP a least 3-4x/week to aid in rehabilitation/strengthening program.              Status at IE: pt was given at evaluation. Current: Same as IE 1/4/22      2. Patient will report no pain greater than 1-2/10 with overhead lifting activities in order to progress back to prior level of function. Status at IE: pain at worst 10/10 with lifting activities. Current: Same as IE                Current:  Pt reports no pain unless doing higher wt lifting 80 -120#  12/23/21      3. Patient will increase flower. UE strength to at least 4/5 throughout all planes to aid in stabilization with overhead lifting activities.                Status at IE: Parascapular L (1-5) R (1-5)   Lower trap 3+ pain 3+ pain   Middle trap 3+ pain 3+   Upper trap 5 4+                  Current: Same as IE   4. Patient will increase FOTO score to 84 points overall to demonstrate improvement in functional status.                Status at IE: 80              Current: Same as IE                 *FOTO score is an established functional score where 100 = no disability*               PLAN  []  Upgrade activities as tolerated     [x]  Continue plan of care  []  Update interventions per flow sheet       []  Discharge due to:_  []  Other:_      EMIGDIO Jackson 1/6/2022  3:09 PM    Future Appointments   Date Time Provider Mandeep Petersne   1/6/2022  5:00 PM Octavio Quitter, [address] THE [facility]   1/11/2022  5:00 PM Sidney Felty [facility] THE [facility]   1/13/2022  5:00 PM Octavio Quitter, [address] [facility]   1/18/2022  5:00 PM Octavio Quitter, [address] [facility]   1/20/2022  5:00 PM Octavio Quitter, [address] [facility]   1/25/2022  5:00 PM Octavio Quitter, [address] [facility]   1/27/2022  5:00 PM Octavio Quitter, [address] THE [facility]

## 2022-01-11 ENCOUNTER — HOSPITAL ENCOUNTER (OUTPATIENT)
Dept: PHYSICAL THERAPY | Age: 37
Discharge: HOME | End: 2022-01-11
Payer: COMMERCIAL

## 2022-01-11 PROCEDURE — 97110 THERAPEUTIC EXERCISES: CPT

## 2022-01-11 PROCEDURE — 97112 NEUROMUSCULAR REEDUCATION: CPT

## 2022-01-11 NOTE — PROGRESS NOTES
PT DAILY TREATMENT NOTE    Patient Name: Prudencio Number  Date:2022  : 1985  [x]  Patient  Verified  Payor: Dana Hebert / Plan: Maday Fritz PPO / Product Type: PPO /    In time:5:00  Out time:5:45  Total Treatment Time (min): 45  Total Timed Codes (min): 45  1:1 Treatment Time (MC/BCBS only): NA   Visit #: 5 of 12    Treatment Dx: Right shoulder pain [M25.511]  Left shoulder pain [M25.512]    SUBJECTIVE  Pain Level (0-10 scale): 0/10  Any medication changes, allergies to medications, adverse drug reactions, diagnosis change, or new procedure performed?: [x] No    [] Yes (see summary sheet for update)  Subjective functional status/changes:   [] No changes reported  \"I don't have any pain right now. \"    OBJECTIVE      35 min Therapeutic Exercise:  [x] See flow sheet :   Rationale: increase ROM, increase strength and improve coordination to improve the patients ability to return to prior level of physical activity. 10 min Neuromuscular Re-education:  [x]  See flow sheet :   Rationale: increase ROM, increase strength and improve coordination  to improve the patients ability to return to prior level of physical activity. With   [] TE   [] TA   [] neuro   [] other: Patient Education: [x] Review HEP    [] Progressed/Changed HEP based on:   [] positioning   [] body mechanics   [] transfers   [] heat/ice application    [] other:      Other Objective/Functional Measures:      Pain Level (0-10 scale) post treatment: 0/10    ASSESSMENT/Changes in Function: Pt tolerated session well with no increased pain with therex. Pt was able to progress with weight resistance for several exercises with no adverse affects. Pt denied pain post tx.      Patient will continue to benefit from skilled PT services to modify and progress therapeutic interventions, address functional mobility deficits, address ROM deficits, address strength deficits, analyze and address soft tissue restrictions, analyze and cue movement patterns, analyze and modify body mechanics/ergonomics and assess and modify postural abnormalities to attain remaining goals. [x]  See Plan of Care  []  See progress note/recertification  []  See Discharge Summary         Progress towards goals / Updated goals:  Short Term Goals: To be accomplished in 2 weeks: 1. Patient will report compliance with HEP at least 1x/day to aid in rehabilitation program.              Status at IE: pt was given at evaluation.               Current: Same as IE 1/4/22               Current: pt reports of engaging with HEP 2x a week. 1/6/22        Long Term Goals: To be accomplished in 6 weeks: 1. Patient will report compliance with HEP a least 3-4x/week to aid in rehabilitation/strengthening program.              Status at IE: pt was given at evaluation.               JSXPDTA: Same as IE 1/4/22      2.Patient will report no pain greater than 1-2/10 with overhead lifting activities in order to progress back to prior level of function.               Status at IE: pain at worst 10/10 with lifting activities.                ZAPMXHB: Same as IE                Current:  Pt reports no pain unless doing higher wt lifting 80 -120#  12/23/21      3.Patient will increase flower. UE strength to at least 4/5 throughout all planes to aid in stabilization with overhead lifting activities.                Status at IE:     Parascapular Left (1-5) Right (1-5)   Lower trap 3+ pain 3+ pain   Middle trap 3+ pain 3+   Upper trap 5 4+                  Current: Same as IE   4.Patient will increase FOTO score to 84 points overall to demonstrate improvement in functional status.                OXPPHY at IE: 81              Current: 100 1/11/2022 Met                 *FOTO score is an established functional score where 100 = no disability*               PLAN  [x]  Upgrade activities as tolerated     [x]  Continue plan of care  []  Update interventions per flow sheet       []  Discharge due to:_  []  Other:_ Marylee Buckle, VALERIE 1/11/2022  5:16 PM    Future Appointments   Date Time Provider Mandeep Trinity   1/13/2022  5:00 PM Dar Chen, [address] [facility] [place] [facility]   1/18/2022  5:00 PM [facility]   1/20/2022  5:00 PM Dar Chen, PT [facility]   1/25/2022  5:00 PM Brandy Melara [facility]   1/27/2022  5:00 PM Dar Chen, PT [facility] THE [facility]

## 2022-01-13 ENCOUNTER — HOSPITAL ENCOUNTER (OUTPATIENT)
Dept: PHYSICAL THERAPY | Age: 37
Discharge: HOME | End: 2022-01-13
Payer: COMMERCIAL

## 2022-01-13 PROCEDURE — 97110 THERAPEUTIC EXERCISES: CPT

## 2022-01-13 PROCEDURE — 97530 THERAPEUTIC ACTIVITIES: CPT

## 2022-01-13 PROCEDURE — 97112 NEUROMUSCULAR REEDUCATION: CPT

## 2022-01-13 NOTE — PROGRESS NOTES
PT DAILY TREATMENT NOTE    Patient Name: Phillip Johnson  Date:2022  : 1985  [x]  Patient  Verified  Payor: Owen Nicholson / Plan: Obie Herrera PPO / Product Type: PPO /    In time:500  Out time:540  Total Treatment Time (min): 40  Total Timed Codes (min): 40  1:1 Treatment Time (MC/BCBS only): 40   Visit #: 6 of 12    Treatment Dx: Right shoulder pain [M25.511]  Left shoulder pain [M25.512]    SUBJECTIVE  Pain Level (0-10 scale): 0/10  Any medication changes, allergies to medications, adverse drug reactions, diagnosis change, or new procedure performed?: [x] No    [] Yes (see summary sheet for update)  Subjective functional status/changes:   [] No changes reported  Pt reports of no pain today. He reports that he was able to perform with 25 lbs dumbbells at the gym without any pain. OBJECTIVE    20 min Therapeutic Exercise:  [x] See flow sheet :   Rationale: increase ROM, increase strength and improve coordination to improve the patients ability to restore PLOF      10 min Therapeutic Activity:  [x]  See flow sheet :   Rationale: increase ROM, increase strength and improve coordination  to improve the patients ability to complete transfers and ADLs without external assist       10 min Neuromuscular Re-education:  [x]  See flow sheet :   Rationale: increase ROM, increase strength and improve coordination  to improve the patients shoulders. With   [x] TE   [] TA   [x] neuro   [] other: Patient Education: [x] Review HEP    [x] Progressed/Changed HEP based on:   [x] positioning   [x] body mechanics   [] transfers   [] heat/ice application    [] other:           Pain Level (0-10 scale) post treatment: 0/10    ASSESSMENT/Changes in Function: Patient tolerated treatment session well today. Patient had no complaints with increased 25 lbs weight to exercise program to accomplish shoulders strengthening and ROM. He also reports that he will be attending to the gym.   Patient continues to make steady progress toward goals and would benefit from continued skilled PT intervention to address remaining deficits outlined in goals below. Patient will continue to benefit from skilled PT services to modify and progress therapeutic interventions, address functional mobility deficits, address ROM deficits, address strength deficits, analyze and address soft tissue restrictions, analyze and cue movement patterns, analyze and modify body mechanics/ergonomics and assess and modify postural abnormalities to attain remaining goals. [x]  See Plan of Care  []  See progress note/recertification  []  See Discharge Summary         Progress towards goals / Updated goals:  Short Term Goals: To be accomplished in 2 weeks: 1. Patient will report compliance with HEP at least 1x/day to aid in rehabilitation program.              Status at IE: pt was given at evaluation.               BTRUAQM: Same as IE 1/4/22          Current: pt reports of engaging with HEP 2x a week. 1/13/22        Long Term Goals: To be accomplished in 6 weeks: 1. Patient will report compliance with HEP a least 3-4x/week to aid in rehabilitation/strengthening program.              Status at IE: pt was given at evaluation.               QETJXPY: Same as IE 1/4/22      2.Patient will report no pain greater than 1-2/10 with overhead lifting activities in order to progress back to prior level of function.               Status at IE: pain at worst 10/10 with lifting activities.                RYFQKNB: Same as IE                Current:  Pt reports no pain unless doing higher wt lifting 80 -120#  12/23/21      3.Patient will increase flower. UE strength to at least 4/5 throughout all planes to aid in stabilization with overhead lifting activities.                Status at IE:     Parascapular Left (1-5) Right (1-5)   Lower trap 3+ pain 3+ pain   Middle trap 3+ pain 3+   Upper trap 5 4+                  Current: Same as IE   4.Patient will increase FOTO score to 84 points overall to demonstrate improvement in functional status.                WZBETL at IE: 81              Current: 100 1/11/2022 Met                 *FOTO score is an established functional score where 100 = no disability*            PLAN  []  Upgrade activities as tolerated     [x]  Continue plan of care  []  Update interventions per flow sheet       []  Discharge due to:_  []  Other:_      EMIGDIO Andrews 1/13/2022  5:06 PM    Future Appointments   Date Time Provider Mandeep Petersen   1/18/2022  5:00 PM [facility]   1/20/2022  5:00 PM [facility]   1/25/2022  5:00 PM [facility], [address] [place] [address]   1/27/2022  5:00 PM [facility], [address] [place] [address]

## 2022-01-18 ENCOUNTER — HOSPITAL ENCOUNTER (OUTPATIENT)
Dept: PHYSICAL THERAPY | Age: 37
Discharge: HOME | End: 2022-01-18
Payer: COMMERCIAL

## 2022-01-18 PROCEDURE — 97530 THERAPEUTIC ACTIVITIES: CPT

## 2022-01-18 PROCEDURE — 97110 THERAPEUTIC EXERCISES: CPT

## 2022-01-18 PROCEDURE — 97112 NEUROMUSCULAR REEDUCATION: CPT

## 2022-01-18 NOTE — PROGRESS NOTES
PT DAILY TREATMENT NOTE    Patient Name: Caleb Hong  Date: 2022  : 1985  [x]  Patient  Verified  Payor: Amie Williamson / Plan: Anisha Hendricks PPO / Product Type: PPO /    In Time: 5:00  Out Time: 5:50  Total Treatment Time (min): 50  Total Timed Codes (min): 50  1:1 Treatment Time ( [address] only): 50   Visit #:     Treatment Dx: Right shoulder pain [M25.511]  Left shoulder pain [M25.512]    SUBJECTIVE  Pre-Treatment Pain Level (0-10 scale): 0    Any medication changes, allergies to medications, adverse drug reactions, diagnosis change, or new procedure performed?: [x] No    [] Yes (see summary sheet for update)    Subjective Functional Status/Changes:  [] No changes reported  Patient states his pain has now migrated from his lat dorsi region to his right [facility] joint reason, particularly when he does higher weights during overhead press and bench press at the gym. Patient states he has pain when using 45# or higher for overhead presses at the gym.     OBJECTIVE    9 min Therapeutic Exercise:  [x] See flow sheet   Rationale: increase ROM, increase strength, and decrease pain to assist in improving patient's ability to perform functional activities and ADLs    18 min Therapeutic Activity:  [x] See flow sheet   Rationale: increase ROM, increase strength, and improve coordination to assist in improving patient's ability to perform functional activities and ADLs    23 min Neuromuscular Re-Education:  [x] See flow sheet   Rationale: improve coordination, improve balance/proprioception, improve posture, and improve core stabilization to assist in improving patient's ability to perform functional activities and ADLs as well as to improve core stabilization during static/dynamic movements      With   [x] TE   [] TA   [] neuro   [] other: Patient Education: [x] Review HEP    [] Progressed/Changed HEP based on:   [] positioning   [] body mechanics   [] transfers   [] heat/ice application    [] other:      Other Objective/Functional Measures: N/A     Pain Level (0-10 scale) Post Treatment: 0    ASSESSMENT/Changes in Function:  Patient responded well to today's treatment without any adverse reactions. Patient with c/o mild pain along his right AC joint region when at the gym using higher weights, but no replication of pain today during palpation or during treatment. He did c/o muscle fatigue toward the end of D1/2 flexion/ext exercises using a resistance band, but demonstrated good form. Discussed continuing lower weights at the gym, along with his focus on being with good form and within a painfree range. Patient will continue to benefit from skilled PT services to further modify and progress therapeutic interventions, address functional mobility deficits, address ROM deficits, address strength deficits, analyze and address soft tissue restrictions, analyze and cue movement patterns, analyze and modify body mechanics/ergonomics, assess and modify postural abnormalities, and instruct in home and community integration to attain remaining goals. [x]  See Plan of Care  []  See Progress Note/Recertification  []  See Discharge Summary         Progress Towards Goals/Updated Goals:  Short Term Goals: To be accomplished in 2 weeks: 1. Patient will report compliance with HEP at least 1x/day to aid in rehabilitation program.              Status at IE: pt was given at evaluation.               ZCEZKBI: Same as IE 1/4/22          Current: Patient reports compliance daily with his HEP.   1/18/22, met        Long Term Goals: To be accomplished in 6 weeks: 1. Patient will report compliance with HEP a least 3-4x/week to aid in rehabilitation/strengthening program.              Status at IE: pt was given at evaluation.    Current: Patient reports compliance daily with his HEP.   1/18/22, met      2.Patient will report no pain greater than 1-2/10 with overhead lifting activities in order to progress back to prior level of function.               HTHLGA at IE: pain at worst 10/10 with lifting activities.                ZHPAPGC: Same as IE                Current:  Pt reports no pain unless doing higher wt lifting 80 -120#  12/23/21      3.Patient will increase flower. UE strength to at least 4/5 throughout all planes to aid in stabilization with overhead lifting activities.                Status at IE:     Parascapular Left (1-5) Right (1-5)   Lower trap 3+ pain 3+ pain   Middle trap 3+ pain 3+   Upper trap 5 4+                  Current: Same as IE   4.Patient will increase FOTO score to 84 points overall to demonstrate improvement in functional status.             RZPIMB at IE: 80              Current: 100 1/11/2022 Met                 *FOTO score is an established functional score where 100 = no disability*         PLAN  []  Upgrade Activities as Tolerated     [x]  Continue Plan of Care  []  Update Interventions per Flow Sheet       []  Discharge Due To:_  []  Other:_      Shawn Monet.  Artem, PT, DPT, ATR  1/18/2022 10:43 AM    Future Appointments   Date Time Provider Mandeep Petersen   1/18/2022  5:00 PM Angélica Hull [place] [facility]   1/20/2022  5:00 PM Marty Burris PT [facility] THE [facility]   1/25/2022  5:00 PM Marty Burris, PT [facility] THE [facility]   1/27/2022  5:00 PM Marty Burris PT [facility]   2/1/2022  5:00 PM Marty Burris, [address] [facility]   2/3/2022  5:00 PM Marty Burris, [address] [facility]   2/8/2022  5:00 PM Marty Burris, [address] THE [facility]   2/10/2022  5:00 PM Marty Burris, PT [facility] THE [facility]

## 2022-01-20 ENCOUNTER — HOSPITAL ENCOUNTER (OUTPATIENT)
Dept: PHYSICAL THERAPY | Age: 37
Discharge: HOME | End: 2022-01-20
Payer: COMMERCIAL

## 2022-01-20 PROCEDURE — 97110 THERAPEUTIC EXERCISES: CPT

## 2022-01-20 PROCEDURE — 97112 NEUROMUSCULAR REEDUCATION: CPT

## 2022-01-20 PROCEDURE — 97530 THERAPEUTIC ACTIVITIES: CPT

## 2022-01-20 NOTE — PROGRESS NOTES
PT DAILY TREATMENT NOTE    Patient Name: Ale Zeng  Date:2022  : 1985  [x]  Patient  Verified  Payor: Nimesh Shaffer / Plan: Eduardo Cabrera PPO / Product Type: PPO /    In time:455  Out time:541  Total Treatment Time (min): 46  Total Timed Codes (min): 46  1:1 Treatment Time ([facility] only): 46   Visit #: 8 of 12    Treatment Dx: Right shoulder pain [M25.511]  Left shoulder pain [M25.512]    SUBJECTIVE  Pain Level (0-10 scale): 0/10  Any medication changes, allergies to medications, adverse drug reactions, diagnosis change, or new procedure performed?: [x] No    [] Yes (see summary sheet for update)  Subjective functional status/changes:   [] No changes reported  Pt reports of no pain with shoulders today. OBJECTIVE      16 min Therapeutic Exercise:  [x] See flow sheet :   Rationale: increase ROM, increase strength and improve coordination to improve the patients ability to restore PLOF      15 min Therapeutic Activity:  [x]  See flow sheet :   Rationale: increase ROM, increase strength and improve coordination  to improve the patients ability to complete transfers and ADLs without external assist       15 min Neuromuscular Re-education:  [x]  See flow sheet :   Rationale: increase ROM, increase strength and improve coordination  to improve the patients shoulders      With   [x] TE   [x] TA   [x] neuro   [] other: Patient Education: [x] Review HEP    [x] Progressed/Changed HEP based on:   [x] positioning   [x] body mechanics   [] transfers   [] heat/ice application    [] other:      Pain Level (0-10 scale) post treatment: 0/10    ASSESSMENT/Changes in Function:  Patient tolerated treatment session well today. Patient had no complaints with exercise program to accomplish strengthening and ROM. Pt reports that when working with 45 lbs dumbbells he started to feel increase pain. SPTA recommended for pt to use 30lbs and gradually increase weight to avoid increase pain in the shoulders.  PT has helped pt to improved with pain management and progressing at the gym. Patient continues to make steady progress toward goals and would benefit from continued skilled PT intervention to address remaining deficits outlined in goals below. Patient will continue to benefit from skilled PT services to modify and progress therapeutic interventions, address functional mobility deficits, address ROM deficits, address strength deficits, analyze and address soft tissue restrictions, analyze and cue movement patterns, analyze and modify body mechanics/ergonomics and assess and modify postural abnormalities to attain remaining goals. [x]  See Plan of Care  []  See progress note/recertification  []  See Discharge Summary         Progress towards goals / Updated goals:  Short Term Goals: To be accomplished in 2 weeks: 1. Patient will report compliance with HEP at least 1x/day to aid in rehabilitation program.              Status at IE: pt was given at evaluation.               CYVJUWG: Same as IE 1/4/22          Current: Patient reports compliance daily with his HEP.   1/18/22, met        Long Term Goals: To be accomplished in 6 weeks: 1. Patient will report compliance with HEP a least 3-4x/week to aid in rehabilitation/strengthening program.              DALIQT at IE: pt was given at evaluation.               AZCKNZT: Patient reports compliance daily with his HEP.   1/18/22, met      2.Patient will report no pain greater than 1-2/10 with overhead lifting activities in order to progress back to prior level of function.               Status at IE: pain at worst 10/10 with lifting activities.                FYYAFHQ: Same as IE                Current:  Pt reports no pain unless doing higher wt lifting 80 -120#  12/23/21   Current: pt reports of no pain unless doing higher then 45lbs.  1/20/22     3.Patient will increase flower. UE strength to at least 4/5 throughout all planes to aid in stabilization with overhead lifting activities.                Status at IE:     Parascapular Left (1-5) Right (1-5)   Lower trap 3+ pain 3+ pain   Middle trap 3+ pain 3+   Upper trap 5 4+                  Current: Same as IE   4.Patient will increase FOTO score to 84 points overall to demonstrate improvement in functional status.                VENICENY at IE: 81              Current: 100 1/11/2022 Met                 *FOTO score is an established functional score where 100 = no disability*            PLAN  []  Upgrade activities as tolerated     [x]  Continue plan of care  []  Update interventions per flow sheet       []  Discharge due to:_  []  Other:_      EMIGDIO Eckert 1/20/2022  4:55 PM    Future Appointments   Date Time Provider Mandeep Petersen   1/20/2022  5:00 PM Chanell Clink, [address] [facility]   1/25/2022  5:00 PM Chanell Clink, [address] [facility]   1/27/2022  5:00 PM Chanell Clink, [address] [facility]   2/1/2022  5:00 PM Chanell Clink, [address] [facility]   2/3/2022  5:00 PM Chanell Clink, [address] [facility]   2/8/2022  5:00 PM Chanell Clink, [address] [facility]   2/10/2022  5:00 PM Chanell Clink, [address] [facility]

## 2022-01-25 ENCOUNTER — HOSPITAL ENCOUNTER (OUTPATIENT)
Dept: PHYSICAL THERAPY | Age: 37
Discharge: HOME | End: 2022-01-25
Payer: COMMERCIAL

## 2022-01-25 PROCEDURE — 97530 THERAPEUTIC ACTIVITIES: CPT

## 2022-01-25 PROCEDURE — 97112 NEUROMUSCULAR REEDUCATION: CPT

## 2022-01-25 PROCEDURE — 97110 THERAPEUTIC EXERCISES: CPT

## 2022-01-25 NOTE — PROGRESS NOTES
PT DAILY TREATMENT NOTE    Patient Name: Cristina Faria  Date:2022  : 1985  [x]  Patient  Verified  Payor: Pritesh Martinez / Plan: Stephanie Flores PPO / Product Type: PPO /    In time:500  Out time:547  Total Treatment Time (min): 47  Total Timed Codes (min): 47  1:1 Treatment Time ([facility] only): 52   Visit #: 9 of 12    Treatment Dx: Right shoulder pain [M25.511]  Left shoulder pain [M25.512]    SUBJECTIVE  Pain Level (0-10 scale): 0/10  Any medication changes, allergies to medications, adverse drug reactions, diagnosis change, or new procedure performed?: [x] No    [] Yes (see summary sheet for update)  Subjective functional status/changes:   [] No changes reported  Pt reports that he felt like something clicked back into place last time at the gym and he felt good using 30lbs. OBJECTIVE    15 min Therapeutic Exercise:  [x] See flow sheet :   Rationale: increase ROM, increase strength, improve coordination, improve balance and increase proprioception to improve the patients ability to restore PLOF    15 min Therapeutic Activity:  [x]  See flow sheet :   Rationale: increase ROM, increase strength, improve coordination, improve balance and increase proprioception  to improve the patients ability to complete transfers and ADLs without external assist     17 min Neuromuscular Re-education:  [x]  See flow sheet :   Rationale: increase ROM, increase strength, improve coordination, improve balance and increase proprioception  to improve the patients ability to activate scap retractors without compensation          With   [x] TE   [x] TA   [x] neuro   [] other: Patient Education: [x] Review HEP    [x] Progressed/Changed HEP based on:   [x] positioning   [] body mechanics   [] transfers   [] heat/ice application    [] other:      Pain Level (0-10 scale) post treatment: 0/10    ASSESSMENT/Changes in Function: Patient tolerated treatment session well today.  Patient instructed to increase weight to 45 lbs at gym for chest press. PT progressed weight today across exercises and Progressed band for PNF. Patient continues to make steady progress toward goals and would benefit from continued skilled PT intervention to address remaining deficits outlined in goals below. Patient will continue to benefit from skilled PT services to modify and progress therapeutic interventions, address functional mobility deficits, address ROM deficits, address strength deficits, analyze and address soft tissue restrictions, analyze and cue movement patterns, analyze and modify body mechanics/ergonomics, assess and modify postural abnormalities and instruct in home and community integration to attain remaining goals. [x]  See Plan of Care  []  See progress note/recertification  []  See Discharge Summary         Progress towards goals / Updated goals:  Short Term Goals: To be accomplished in 2 weeks: 1. Patient will report compliance with HEP at least 1x/day to aid in rehabilitation program.              Status at IE: pt was given at evaluation.               WHLQOYV: Same as IE 1/4/22          Current: Patient reports compliance daily with his HEP.   1/18/22, met      Long Term Goals: To be accomplished in 6 weeks: 1. Patient will report compliance with HEP a least 3-4x/week to aid in rehabilitation/strengthening program.              YKEPSM at IE: pt was given at evaluation.               YGWGVHA: Patient reports compliance daily with his HEP.   1/18/22, met      2.Patient will report no pain greater than 1-2/10 with overhead lifting activities in order to progress back to prior level of function.               Status at IE: pain at worst 10/10 with lifting activities.                LNWDYVL: Same as IE                Current:  Pt reports no pain unless doing higher wt lifting 80 -120#  12/23/21   Current: pt reports of no pain using 30lbs .  1/25/22      3.Patient will increase flower. UE strength to at least 4/5 throughout all planes to aid in stabilization with overhead lifting activities.                Status at IE:     Parascapular Left (1-5) Right (1-5)   Lower trap 3+ pain 3+ pain   Middle trap 3+ pain 3+   Upper trap 5 4+                  Current: Same as IE   4.Patient will increase FOTO score to 84 points overall to demonstrate improvement in functional status.                KCUBGQ at IE: 81              Current: 100 1/11/2022 Met                 *FOTO score is an established functional score where 100 = no disability*     PLAN  [x]  Upgrade activities as tolerated     [x]  Continue plan of care  [x]  Update interventions per flow sheet       []  Discharge due to:_  []  Other:_      Claude Bring, PT 1/25/2022  5:14 PM    Future Appointments   Date Time Provider Mandeep Petersen   1/27/2022  5:00 PM Deloria Kirks, [address] [facility]   2/1/2022  5:00 PM Deloria Kirks, [address] [facility]   2/3/2022  5:00 PM Deloria Kirks, [address] [facility]   2/8/2022  5:00 PM Deloria Kirks, [address] [facility]   2/10/2022  5:00 PM Deloria Kirks, [address] THE [facility] OF [facility]

## 2022-01-27 ENCOUNTER — HOSPITAL ENCOUNTER (OUTPATIENT)
Dept: PHYSICAL THERAPY | Age: 37
Discharge: HOME | End: 2022-01-27
Payer: COMMERCIAL

## 2022-01-27 PROCEDURE — 97530 THERAPEUTIC ACTIVITIES: CPT

## 2022-01-27 PROCEDURE — 97112 NEUROMUSCULAR REEDUCATION: CPT

## 2022-01-27 PROCEDURE — 97110 THERAPEUTIC EXERCISES: CPT

## 2022-01-27 NOTE — PROGRESS NOTES
PT DAILY TREATMENT NOTE    Patient Name: Lina Self  Date:2022  : 1985  [x]  Patient  Verified  Payor: Christian Friend / Plan: Nile Brown PPO / Product Type: PPO /    In time:500  Out time:549  Total Treatment Time (min): 49  Total Timed Codes (min): 49  1:1 Treatment Time ([facility] only): 49   Visit #: 10 of 12    Treatment Dx: Right shoulder pain [M25.511]  Left shoulder pain [M25.512]    SUBJECTIVE  Pain Level (0-10 scale): 0/10  Any medication changes, allergies to medications, adverse drug reactions, diagnosis change, or new procedure performed?: [x] No    [] Yes (see summary sheet for update)  Subjective functional status/changes:   [] No changes reported  Pt reports 40 lbs for incline and over head press was fine at the gym. He was unable to use bench press.      OBJECTIVE    15 min Therapeutic Exercise:  [x] See flow sheet :   Rationale: increase ROM, increase strength, improve coordination, improve balance and increase proprioception to improve the patients ability to restore PLOF    15 min Therapeutic Activity:  [x]  See flow sheet :   Rationale: increase ROM, increase strength, improve coordination, improve balance and increase proprioception  to improve the patients ability to complete transfers and ADLs without external assist     19 min Neuromuscular Re-education:  [x]  See flow sheet :   Rationale: increase ROM, increase strength, improve coordination, improve balance and increase proprioception  to improve the patients ability to activate scap stabilizers  without compensation        With   [x] TE   [x] TA   [x] neuro   [] other: Patient Education: [x] Review HEP    [x] Progressed/Changed HEP based on:   [x] positioning   [x] body mechanics   [] transfers   [] heat/ice application    [] other:      Other Objective/Functional Measures: see goals assessment below     Pain Level (0-10 scale) post treatment: 0/10    ASSESSMENT/Changes in Function: Patient tolerated treatment session well today. Patient had no complaints with addition of Band ER/IR to exercise program to accomplish improved rotator cuff strength. Pt reports that 40 lbs went well at gym with over head press and incline. Recommend pt progress to 50 lbs and trial bench press of bar weight. Pt demod improvements in Trevin trap muscles as evidence below in goals assessment. Pt also has decreased pain in testing. Patient continues to make steady progress toward goals and would benefit from continued skilled PT intervention to address remaining deficits outlined in goals below. Patient will continue to benefit from skilled PT services to modify and progress therapeutic interventions, address functional mobility deficits, address ROM deficits, address strength deficits, analyze and address soft tissue restrictions, analyze and cue movement patterns, analyze and modify body mechanics/ergonomics, assess and modify postural abnormalities and instruct in home and community integration to attain remaining goals. [x]  See Plan of Care  []  See progress note/recertification  []  See Discharge Summary         Progress towards goals / Updated goals:  Short Term Goals: To be accomplished in 2 weeks: 1. Patient will report compliance with HEP at least 1x/day to aid in rehabilitation program.              Status at IE: pt was given at evaluation.               BZNHCBS: Same as IE 1/4/22          Current: Patient reports compliance daily with his HEP.   1/18/22, met      Long Term Goals: To be accomplished in 6 weeks: 1. Patient will report compliance with HEP a least 3-4x/week to aid in rehabilitation/strengthening program.              AXFPUJ at IE: pt was given at evaluation.               SBYUURL: Patient reports compliance daily with his HEP.   1/18/22, met      2.Patient will report no pain greater than 1-2/10 with overhead lifting activities in order to progress back to prior level of function.               Status at IE: pain at worst 10/10 with lifting activities.                PYCMUGC: Same as IE                Current:  Pt reports no pain unless doing higher wt lifting 80 -120#  12/23/21   Current: pt reports of no pain using 40lbs . 1/27/22      3.Patient will increase flower. UE strength to at least 4/5 throughout all planes to aid in stabilization with overhead lifting activities.                Status at IE:     Parascapular Left (1-5) Right (1-5)   Lower trap 3+ pain 3+ pain   Middle trap 3+ pain 3+   Upper trap 5 4+                  Current: 1/27/22  Parascapular Left (1-5) Right (1-5)   Lower trap 4- 4-   Middle trap 4- 4   Upper trap 5 4+      4.Patient will increase FOTO score to 84 points overall to demonstrate improvement in functional status.                SHWLPH at IE: 81              Current: 100 1/11/2022 Met                 *FOTO score is an established functional score where 100 = no disability*     PLAN  [x]  Upgrade activities as tolerated     [x]  Continue plan of care  [x]  Update interventions per flow sheet       []  Discharge due to:_  []  Other:_      Fely Burks, PT 1/27/2022  2:13 PM    Future Appointments   Date Time Provider Mandeep Petersen   1/27/2022  5:00 PM Jose Quinonez, [address] THE [facility]   2/1/2022  5:00 PM Jose Quinonez, [address] THE [facility]   2/3/2022  5:00 PM Jose Quinonez, [address] THE [facility]   2/8/2022  5:00 PM Jose Quinonez, [address] THE [facility]   2/10/2022  5:00 PM Jose Quinonez, PT [facility] THE [facility]

## 2022-02-01 ENCOUNTER — HOSPITAL ENCOUNTER (OUTPATIENT)
Dept: PHYSICAL THERAPY | Age: 37
Discharge: HOME | End: 2022-02-01
Payer: COMMERCIAL

## 2022-02-01 PROCEDURE — 97110 THERAPEUTIC EXERCISES: CPT

## 2022-02-01 PROCEDURE — 97530 THERAPEUTIC ACTIVITIES: CPT

## 2022-02-01 PROCEDURE — 97112 NEUROMUSCULAR REEDUCATION: CPT

## 2022-02-01 NOTE — PROGRESS NOTES
PT DAILY TREATMENT NOTE    Patient Name: Heriberto Rubinstein  Date:2022  : 1985  [x]  Patient  Verified  Payor: Emanuel Coffee / Plan: Mitch Rashid PPO / Product Type: PPO /    In time:500  Out time:548  Total Treatment Time (min): 48  Total Timed Codes (min): 48  1:1 Treatment Time (MC/BCBS only): 48   Visit #: 11 of 12    Treatment Dx: Right shoulder pain [M25.511]  Left shoulder pain [M25.512]    SUBJECTIVE  Pain Level (0-10 scale): 0/10  Any medication changes, allergies to medications, adverse drug reactions, diagnosis change, or new procedure performed?: [x] No    [] Yes (see summary sheet for update)  Subjective functional status/changes:   [] No changes reported  Pt reports of no changes. He states that he was not able to attend the gym last week due to the weather condition. OBJECTIVE    18 min Therapeutic Exercise:  [x] See flow sheet :   Rationale: increase ROM, increase strength and improve coordination to improve the patients ability to restore PLOF    15 min Therapeutic Activity:  [x]  See flow sheet :   Rationale: increase ROM, increase strength and improve coordination  to improve the patients ability to complete transfers and ADLs without external assist     15 min Neuromuscular Re-education:  [x]  See flow sheet :   Rationale: increase ROM, increase strength and improve coordination  to improve the patients ability to return to prior level of physical activity          With   [x] TE   [x] TA   [x] neuro   [] other: Patient Education: [x] Review HEP    [x] Progressed/Changed HEP based on:   [x] positioning   [x] body mechanics   [] transfers   [] heat/ice application    [] other:         Pain Level (0-10 scale) post treatment: 0/10    ASSESSMENT/Changes in Function:   Patient tolerated treatment session well today. Patient had no complaints with  exercise program to accomplish shoulder strengthening. Pt will be attending the gym tonight and attempt bench press 45lbs bar.  Feel pt is progressing well and would benefit from 1 more visit before DC to see how Bench press is going and give HEP for return to gym. Patient continues to make steady progress toward goals and would benefit from continued skilled PT intervention to address remaining deficits outlined in goals below. Patient will continue to benefit from skilled PT services to modify and progress therapeutic interventions, address functional mobility deficits, address ROM deficits, address strength deficits, analyze and address soft tissue restrictions, analyze and cue movement patterns, analyze and modify body mechanics/ergonomics and assess and modify postural abnormalities to attain remaining goals. [x]  See Plan of Care  []  See progress note/recertification  []  See Discharge Summary         Progress towards goals / Updated goals:  Short Term Goals: To be accomplished in 2 weeks: 1. Patient will report compliance with HEP at least 1x/day to aid in rehabilitation program.              Status at IE: pt was given at evaluation. Current: Same as IE 1/4/22          Current: Patient reports compliance daily with his HEP.   1/18/22, met      Long Term Goals: To be accomplished in 6 weeks: 1. Patient will report compliance with HEP a least 3-4x/week to aid in rehabilitation/strengthening program.              Status at IE: pt was given at evaluation. Current: Patient reports compliance daily with his HEP.   1/18/22, met      2. Patient will report no pain greater than 1-2/10 with overhead lifting activities in order to progress back to prior level of function. Status at IE: pain at worst 10/10 with lifting activities. Current: Same as IE                Current:  Pt reports no pain unless doing higher wt lifting 80 -120#  12/23/21   Current: pt reports of no pain using 40lbs, sometime have muscle fatigue and soreness at the gym. 2/1/22 Goal Met      3. Patient will increase flower.  UE strength to at least 4/5 throughout all planes to aid in stabilization with overhead lifting activities. Status at IE:     Parascapular Left (1-5) Right (1-5)   Lower trap 3+ pain 3+ pain   Middle trap 3+ pain 3+   Upper trap 5 4+                  Current: 1/27/22  Parascapular Left (1-5) Right (1-5)   Lower trap 4- 4-   Middle trap 4- 4   Upper trap 5 4+       4. Patient will increase FOTO score to 84 points overall to demonstrate improvement in functional status.                Status at IE: 80              Current: 100 1/11/2022 Met                 *FOTO score is an established functional score where 100 = no disability*         PLAN  []  Upgrade activities as tolerated     [x]  Continue plan of care  []  Update interventions per flow sheet       []  Discharge due to:_  []  Other:_      EMIGDIO Goff 2/1/2022  3:13 PM    Future Appointments   Date Time Provider Mandeep Petersen   2/1/2022  5:00 PM Wil Tinsley, [address] [facility]   2/3/2022  5:00 PM Wil Tinsley, [address] [facility]   2/8/2022  5:00 PM Wil Tinsley, [address] [facility]   2/10/2022  5:00 PM [facility]ins[facility]

## 2022-02-03 ENCOUNTER — HOSPITAL ENCOUNTER (OUTPATIENT)
Dept: PHYSICAL THERAPY | Age: 37
Discharge: HOME | End: 2022-02-03
Payer: COMMERCIAL

## 2022-02-03 PROCEDURE — 97112 NEUROMUSCULAR REEDUCATION: CPT

## 2022-02-03 PROCEDURE — 97110 THERAPEUTIC EXERCISES: CPT

## 2022-02-03 PROCEDURE — 97530 THERAPEUTIC ACTIVITIES: CPT

## 2022-02-03 NOTE — PROGRESS NOTES
Physical Therapy Discharge Instructions    [facility] [address] Kourtney Lerma, [address] [facility]  Ph [phone]  Fx [phone]      Patient: Silviano First  : 1985      Continue Home Exercise Program 1 times per day for 4 weeks, then decrease to 3 times per week      Continue with    [] Ice  as needed 1 times per day     [x] Heat           Follow up with MD:     [] Upon completion of therapy     [x] As needed      Recommendations:     []   Return to activity with home program    []   Return to activity with the following modifications:       []Post Rehab Program    []Join Independent aquatic program     [x]Return to/join local gym      Other Comments: Progress gym weight by 10lbs each visit until you reach previous weight or reproduce pain.          Harmony Helms, PT 2/3/2022 5:17 PM  DOLLY NamA 2/3/22

## 2022-02-03 NOTE — PROGRESS NOTES
[facility]  [address] Talisha Hubbard, [address] [facility] [place]  Ph [phone]  Fx [phone]    Physical Therapy Discharge Summary    Patient name: [facility] of Care: 2021   Referral Dilip Cisse MD : 1985                Medical Diagnosis: Right shoulder pain [M25.511]  Left shoulder pain [M25.512]    Onset Date:10/21   Treatment Diagnosis: right and left shoulder pain, right elbow pain.                                            ICD-10: M25.511, M25.512   Prior Hospitalization: see medical history Provider#: [id number]   Medications: Verified on Patient summary List   Comorbidities/PMHx/Surgical Hx: retrains water and takes a water pill for that.   Prior level of function: functionally independent, no AD, active lifestyle, goes to gym at least 4 times a week. Visits from Start of Care: 12    Missed Visits: 0    Reporting Period : 21 to 2/3/22    Goals/Measure of Progress:  Short Term Goals: To be accomplished in 2 weeks: 1. Patient will report compliance with HEP at least 1x/day to aid in rehabilitation program.              Status at IE: pt was given at evaluation.               BSGSLAG: Same as IE 22          Current: Patient reports compliance daily with his HEP.   22, met      Long Term Goals: To be accomplished in 6 weeks: 1. Patient will report compliance with HEP a least 3-4x/week to aid in rehabilitation/strengthening program.              ZJWVQV at IE: pt was given at evaluation.               WIQXJTG: Patient reports compliance daily with his HEP.   22, met      2.Patient will report no pain greater than 1-2/10 with overhead lifting activities in order to progress back to prior level of function.               Status at IE: pain at worst 10/10 with lifting activities.                DBOLMRL: Same as IE                Current:  Pt reports no pain unless doing higher wt lifting 80 -120#  21   Current: pt reports of no pain using 40lbs, sometime have muscle fatigue and soreness at the gym. 2/1/22 Goal Met      3.Patient will increase digna. UE strength to at least 4/5 throughout all planes to aid in stabilization with overhead lifting activities.                Status at IE:     Parascapular Left (1-5) Right (1-5)   Lower trap 3+ pain 3+ pain   Middle trap 3+ pain 3+   Upper trap 5 4+                 Last PN: 1/27/22  Parascapular Left (1-5) Right (1-5)   Lower trap 4- 4-   Middle trap 4- 4   Upper trap 5 4+            Current: 2/3//22 Goal Met  Parascapular Left (1-5) Right (1-5)   Lower trap 5 5   Middle trap 5 5   Upper trap 5 5          4.Patient will increase FOTO score to 84 points overall to demonstrate improvement in functional status.             OZHNTB at IE: 80              Current: 100 1/11/2022 Met        Assessment/ Summary of Care: Patient tolerated treatment session well today. Patient had no complaints with exercise program to accomplish Digna shoulders strengthening to return to prior level of physical activity. Pt have met all STG and LTG. Pt has been restored to WNL without any pain with the shoulders. He has return to the gym without any issues and is gradually moving up with his free weights as tolerated. Patient has met goals and is prepared for DC.      RECOMMENDATIONS:  [x]Discontinue therapy: [x]Patient has reached or is progressing toward set goals      []Patient is non-compliant or has abdicated      []Due to lack of appreciable progress towards set goals    Lindie Mortimer, PT 2/3/2022 5:56 PM

## 2022-02-03 NOTE — PROGRESS NOTES
PT DAILY TREATMENT NOTE    Patient Name: Yenni Mclean  Date:2/3/2022  : 1985  [x]  Patient  Verified  Payor: Marky Menjivar / Plan: Odalys Bustos PPO / Product Type: PPO /    In time:502  Out time:545  Total Treatment Time (min): 43  Total Timed Codes (min): 43  1:1 Treatment Time (MC/BCBS only): 43   Visit #: 12 of 12    Treatment Dx: Right shoulder pain [M25.511]  Left shoulder pain [M25.512]    SUBJECTIVE  Pain Level (0-10 scale): 0/10  Any medication changes, allergies to medications, adverse drug reactions, diagnosis change, or new procedure performed?: [x] No    [] Yes (see summary sheet for update)  Subjective functional status/changes:   [] No changes reported  Pt reports that he was able to work with 65 lbs dumbbells overhead press at the gym without any issue. He have not attempt the bench press bar at the gym. OBJECTIVE    15 min Therapeutic Exercise:  [x] See flow sheet :   Rationale: increase ROM, increase strength and improve coordination to improve the patients ability to restore PLOF    13 min Therapeutic Activity:  [x]  See flow sheet :   Rationale: increase ROM, increase strength and improve coordination  to improve the patients ability to complete transfers and ADLs without external assist     15 min Neuromuscular Re-education:  [x]  See flow sheet :   Rationale: increase ROM, increase strength and improve coordination  to improve the patients ability to return to prior level of physical activity          With   [x] TE   [x] TA   [x] neuro   [] other: Patient Education: [x] Review HEP    [x] Progressed/Changed HEP based on:   [x] positioning   [x] body mechanics   [] transfers   [] heat/ice application    [] other:      Pain Level (0-10 scale) post treatment: 0/10    ASSESSMENT/Changes in Function: . Patient tolerated treatment session well today. Patient had no complaints with exercise program to accomplish Digna shoulders strengthening to return to prior level of physical activity.  Pt have met all STG and LTG. Pt has been restored to WNL without any pain with the shoulders. He has return to the gym without any issues and is gradually moving up with his free weights as tolerated. Patient continues to make steady progress toward goals and would benefit from continued skilled PT intervention to address remaining deficits outlined in goals below. []  See Plan of Care  []  See progress note/recertification  [x]  See Discharge Summary         Progress towards goals / Updated goals:  Short Term Goals: To be accomplished in 2 weeks: 1. Patient will report compliance with HEP at least 1x/day to aid in rehabilitation program.              Status at IE: pt was given at evaluation.               BAMDCQW: Same as IE 1/4/22          Current: Patient reports compliance daily with his HEP.   1/18/22, met      Long Term Goals: To be accomplished in 6 weeks: 1. Patient will report compliance with HEP a least 3-4x/week to aid in rehabilitation/strengthening program.              UINYPT at IE: pt was given at evaluation.               FZPWHJL: Patient reports compliance daily with his HEP.   1/18/22, met      2.Patient will report no pain greater than 1-2/10 with overhead lifting activities in order to progress back to prior level of function.               Status at IE: pain at worst 10/10 with lifting activities.                RKSENSD: Same as IE                Current:  Pt reports no pain unless doing higher wt lifting 80 -120#  12/23/21   Current: pt reports of no pain using 40lbs, sometime have muscle fatigue and soreness at the gym. 2/1/22 Goal Met      3.Patient will increase flower. UE strength to at least 4/5 throughout all planes to aid in stabilization with overhead lifting activities.                Status at IE:     Parascapular Left (1-5) Right (1-5)   Lower trap 3+ pain 3+ pain   Middle trap 3+ pain 3+   Upper trap 5 4+                 Last PN: 1/27/22  Parascapular Left (1-5) Right (1-5)   Lower trap 4- 4-   Middle trap 4- 4   Upper trap 5 4+           Current: 2/3//22 Goal Met  Parascapular Left (1-5) Right (1-5)   Lower trap 5 5   Middle trap 5 5   Upper trap 5 5        4. Patient will increase FOTO score to 84 points overall to demonstrate improvement in functional status.             HPNINK at IE: 80              Current: 100 1/11/2022 Met           PLAN  []  Upgrade activities as tolerated     []  Continue plan of care  []  Update interventions per flow sheet       [x]  Discharge due to: HEP and gym routine.   []  Other:_      EMIGDIO Weber 2/3/2022  10:28 AM    Future Appointments   Date Time Provider Mandeep Petersen   2/3/2022  5:00 PM Yuan Ip, [address] [facility]   2/8/2022  5:00 PM Yuan Ip, [address] [facility]   2/10/2022  5:00 PM Yuan Ip, PT [facility]

## 2022-02-08 ENCOUNTER — APPOINTMENT (OUTPATIENT)
Dept: PHYSICAL THERAPY | Age: 37
End: 2022-02-08
Payer: COMMERCIAL

## 2022-02-10 ENCOUNTER — APPOINTMENT (OUTPATIENT)
Dept: PHYSICAL THERAPY | Age: 37
End: 2022-02-10
Payer: COMMERCIAL

## 2022-03-18 PROBLEM — I10 BENIGN HYPERTENSION: Status: ACTIVE | Noted: 2020-01-09

## 2022-03-19 PROBLEM — Z90.3 HISTORY OF SLEEVE GASTRECTOMY: Status: ACTIVE | Noted: 2017-01-20

## 2022-03-19 PROBLEM — E66.812 CLASS 2 OBESITY WITHOUT SERIOUS COMORBIDITY WITH BODY MASS INDEX (BMI) OF 35.0 TO 35.9 IN ADULT: Status: ACTIVE | Noted: 2020-01-09

## 2022-03-19 PROBLEM — B37.2 CANDIDAL INTERTRIGO: Status: ACTIVE | Noted: 2018-03-11

## 2022-03-20 PROBLEM — E65: Status: ACTIVE | Noted: 2018-03-09

## 2025-03-14 ENCOUNTER — HOSPITAL ENCOUNTER (OUTPATIENT)
Facility: HOSPITAL | Age: 40
Discharge: HOME | End: 2025-03-17
Payer: COMMERCIAL

## 2025-03-14 DIAGNOSIS — Z01.818 PREOP TESTING: ICD-10-CM

## 2025-03-14 PROBLEM — M93.262 OSTEOCHONDRITIS DISSECANS OF KNEE, LEFT: Status: ACTIVE | Noted: 2025-03-14

## 2025-03-14 LAB
ALBUMIN SERPL-MCNC: 3.7 G/DL (ref 3.4–5)
ALBUMIN/GLOB SERPL: 1.1 (ref 0.8–1.7)
ALP SERPL-CCNC: 88 U/L (ref 45–117)
ALT SERPL-CCNC: 34 U/L (ref 16–61)
ANION GAP SERPL CALC-SCNC: 6 MMOL/L (ref 3–18)
AST SERPL-CCNC: 25 U/L (ref 10–38)
BILIRUB SERPL-MCNC: 0.6 MG/DL (ref 0.2–1)
BUN SERPL-MCNC: 18 MG/DL (ref 7–18)
BUN/CREAT SERPL: 18 (ref 12–20)
CALCIUM SERPL-MCNC: 8.9 MG/DL (ref 8.5–10.1)
CHLORIDE SERPL-SCNC: 102 MMOL/L (ref 100–111)
CO2 SERPL-SCNC: 31 MMOL/L (ref 21–32)
CREAT SERPL-MCNC: 0.98 MG/DL (ref 0.6–1.3)
ERYTHROCYTE [DISTWIDTH] IN BLOOD BY AUTOMATED COUNT: 14.2 % (ref 11.6–14.5)
GLOBULIN SER CALC-MCNC: 3.4 G/DL (ref 2–4)
GLUCOSE SERPL-MCNC: 95 MG/DL (ref 74–99)
HCT VFR BLD AUTO: 45.2 % (ref 36–48)
HGB BLD-MCNC: 15.4 G/DL (ref 13–16)
MCH RBC QN AUTO: 30.3 PG (ref 24–34)
MCHC RBC AUTO-ENTMCNC: 34.1 G/DL (ref 31–37)
MCV RBC AUTO: 88.8 FL (ref 78–100)
NRBC # BLD: 0 K/UL (ref 0–0.01)
NRBC BLD-RTO: 0 PER 100 WBC
PLATELET # BLD AUTO: 234 K/UL (ref 135–420)
PMV BLD AUTO: 11.3 FL (ref 9.2–11.8)
POTASSIUM SERPL-SCNC: 4 MMOL/L (ref 3.5–5.5)
PROT SERPL-MCNC: 7.1 G/DL (ref 6.4–8.2)
RBC # BLD AUTO: 5.09 M/UL (ref 4.35–5.65)
SODIUM SERPL-SCNC: 139 MMOL/L (ref 136–145)
WBC # BLD AUTO: 6.2 K/UL (ref 4.6–13.2)

## 2025-03-14 PROCEDURE — 93005 ELECTROCARDIOGRAM TRACING: CPT | Performed by: ORTHOPAEDIC SURGERY

## 2025-03-14 PROCEDURE — 80053 COMPREHEN METABOLIC PANEL: CPT

## 2025-03-14 PROCEDURE — 85027 COMPLETE CBC AUTOMATED: CPT

## 2025-03-14 NOTE — H&P
History of Chief Complaint:  Trell is in for followup of his left mild patellofemoral DJD/medial femoral condyle classic location osteochondritis dissecans as seen by x-ray and MRI/11mm unstable in situ fragment. The patient is in for followup. The MRI shows the above diagnosis consistent with his complaints.    Past Medical/Surgical History:    Disease/Disorder Date Side Surgery Date Side Comment   Hypertension            Discectomy, lumbar 08/26/2015  SH 07/05/2024 -L5-S1      Excess skin removed         Gastric sleeve         Ring finger surgery 12/2013 left      Allergies:  No known allergies.  Ingredient Reaction Medication Name Comment   NO KNOWN ALLERGIES          Current Medications:    Medication Directions   glucosamine-chondroitin 750 mg-600 mg tablet take 2 tablets by oral route  every day   hydrochlorothiazide 25 mg tablet TAKE 1 TABLET BY MOUTH EVERY DAY     Social History:    SMOKING  Status Tobacco Type Units Per Day Yrs Used   Former smoker Cigar 2.00      ALCOHOL  There is a history of alcohol use.   Type: Beer and liquor. 2 drinks consumed weekly.    Family History:    Disease Detail Family Member Age Cause of Death Comments   No relevant family history         Review of Systems:    GENERAL:  Patient has no signs of fever, chills or weight change. or fatigue  HEAD/ENTM:  Patient has no signs of headaches, dizziness, hearing loss, ringing in ears, sore throat/hoarseness, recent cold, double vision, blurred vision, itchy eyes, eye redness or eye discharge.  NEUROLOGIC:  Patient has no signs of fainting, muscle weakness, numbness/tingling, loss of balance or seizure disorder.  CARDIOVASCULAR:  Patient has no signs of chest pain, palpitations, rheumatic fever or heart murmur.  RESPIRATORY:  Patient has no signs of chronic cough, wheezing, difficulty breathing, pain on breathing or shortness of breath.  GASTROINTESTINAL:  Patient has no signs of nausea/vomiting, difficulty swallowing, gas/bloating,

## 2025-03-15 LAB
EKG ATRIAL RATE: 60 BPM
EKG DIAGNOSIS: NORMAL
EKG P AXIS: 77 DEGREES
EKG P-R INTERVAL: 188 MS
EKG Q-T INTERVAL: 424 MS
EKG QRS DURATION: 96 MS
EKG QTC CALCULATION (BAZETT): 424 MS
EKG R AXIS: 73 DEGREES
EKG T AXIS: 66 DEGREES
EKG VENTRICULAR RATE: 60 BPM

## 2025-03-19 ENCOUNTER — HOSPITAL ENCOUNTER (OUTPATIENT)
Facility: HOSPITAL | Age: 40
Setting detail: OUTPATIENT SURGERY
Discharge: HOME | End: 2025-03-19
Attending: ORTHOPAEDIC SURGERY | Admitting: ORTHOPAEDIC SURGERY
Payer: COMMERCIAL

## 2025-03-19 ENCOUNTER — ANESTHESIA EVENT (OUTPATIENT)
Facility: HOSPITAL | Age: 40
End: 2025-03-19
Payer: COMMERCIAL

## 2025-03-19 ENCOUNTER — ANESTHESIA (OUTPATIENT)
Facility: HOSPITAL | Age: 40
End: 2025-03-19
Payer: COMMERCIAL

## 2025-03-19 VITALS
HEIGHT: 71 IN | WEIGHT: 280 LBS | RESPIRATION RATE: 18 BRPM | BODY MASS INDEX: 39.2 KG/M2 | TEMPERATURE: 97.4 F | OXYGEN SATURATION: 100 % | SYSTOLIC BLOOD PRESSURE: 134 MMHG | HEART RATE: 56 BPM | DIASTOLIC BLOOD PRESSURE: 81 MMHG

## 2025-03-19 PROBLEM — M93.262 OSTEOCHONDRITIS DISSECANS OF KNEE, LEFT: Status: RESOLVED | Noted: 2025-03-14 | Resolved: 2025-03-19

## 2025-03-19 PROCEDURE — 6360000002 HC RX W HCPCS: Performed by: PHYSICIAN ASSISTANT

## 2025-03-19 PROCEDURE — 6360000002 HC RX W HCPCS: Performed by: NURSE ANESTHETIST, CERTIFIED REGISTERED

## 2025-03-19 PROCEDURE — 2709999900 HC NON-CHARGEABLE SUPPLY: Performed by: ORTHOPAEDIC SURGERY

## 2025-03-19 PROCEDURE — 3700000000 HC ANESTHESIA ATTENDED CARE: Performed by: ORTHOPAEDIC SURGERY

## 2025-03-19 PROCEDURE — 2500000003 HC RX 250 WO HCPCS: Performed by: PHYSICIAN ASSISTANT

## 2025-03-19 PROCEDURE — 2580000003 HC RX 258: Performed by: PHYSICIAN ASSISTANT

## 2025-03-19 PROCEDURE — 7100000000 HC PACU RECOVERY - FIRST 15 MIN: Performed by: ORTHOPAEDIC SURGERY

## 2025-03-19 PROCEDURE — 3700000001 HC ADD 15 MINUTES (ANESTHESIA): Performed by: ORTHOPAEDIC SURGERY

## 2025-03-19 PROCEDURE — 7100000011 HC PHASE II RECOVERY - ADDTL 15 MIN: Performed by: ORTHOPAEDIC SURGERY

## 2025-03-19 PROCEDURE — 2500000003 HC RX 250 WO HCPCS: Performed by: ORTHOPAEDIC SURGERY

## 2025-03-19 PROCEDURE — 3600000012 HC SURGERY LEVEL 2 ADDTL 15MIN: Performed by: ORTHOPAEDIC SURGERY

## 2025-03-19 PROCEDURE — C1713 ANCHOR/SCREW BN/BN,TIS/BN: HCPCS | Performed by: ORTHOPAEDIC SURGERY

## 2025-03-19 PROCEDURE — 3600000002 HC SURGERY LEVEL 2 BASE: Performed by: ORTHOPAEDIC SURGERY

## 2025-03-19 PROCEDURE — 7100000001 HC PACU RECOVERY - ADDTL 15 MIN: Performed by: ORTHOPAEDIC SURGERY

## 2025-03-19 PROCEDURE — 7100000010 HC PHASE II RECOVERY - FIRST 15 MIN: Performed by: ORTHOPAEDIC SURGERY

## 2025-03-19 DEVICE — IMPLANTABLE DEVICE: Type: IMPLANTABLE DEVICE | Site: KNEE | Status: FUNCTIONAL

## 2025-03-19 RX ORDER — DIPHENHYDRAMINE HYDROCHLORIDE 50 MG/ML
12.5 INJECTION, SOLUTION INTRAMUSCULAR; INTRAVENOUS
Status: DISCONTINUED | OUTPATIENT
Start: 2025-03-19 | End: 2025-03-19 | Stop reason: HOSPADM

## 2025-03-19 RX ORDER — LIDOCAINE HYDROCHLORIDE 20 MG/ML
INJECTION, SOLUTION EPIDURAL; INFILTRATION; INTRACAUDAL; PERINEURAL
Status: DISCONTINUED | OUTPATIENT
Start: 2025-03-19 | End: 2025-03-19 | Stop reason: SDUPTHER

## 2025-03-19 RX ORDER — LABETALOL HYDROCHLORIDE 5 MG/ML
10 INJECTION, SOLUTION INTRAVENOUS
Status: DISCONTINUED | OUTPATIENT
Start: 2025-03-19 | End: 2025-03-19 | Stop reason: HOSPADM

## 2025-03-19 RX ORDER — SODIUM CHLORIDE 9 MG/ML
INJECTION, SOLUTION INTRAVENOUS PRN
Status: DISCONTINUED | OUTPATIENT
Start: 2025-03-19 | End: 2025-03-19 | Stop reason: HOSPADM

## 2025-03-19 RX ORDER — FENTANYL CITRATE 50 UG/ML
25 INJECTION, SOLUTION INTRAMUSCULAR; INTRAVENOUS EVERY 5 MIN PRN
Status: DISCONTINUED | OUTPATIENT
Start: 2025-03-19 | End: 2025-03-19 | Stop reason: HOSPADM

## 2025-03-19 RX ORDER — OXYCODONE HYDROCHLORIDE 5 MG/1
5 TABLET ORAL
Status: DISCONTINUED | OUTPATIENT
Start: 2025-03-19 | End: 2025-03-19 | Stop reason: HOSPADM

## 2025-03-19 RX ORDER — FENTANYL CITRATE 50 UG/ML
INJECTION, SOLUTION INTRAMUSCULAR; INTRAVENOUS
Status: DISCONTINUED | OUTPATIENT
Start: 2025-03-19 | End: 2025-03-19 | Stop reason: SDUPTHER

## 2025-03-19 RX ORDER — SODIUM CHLORIDE 0.9 % (FLUSH) 0.9 %
5-40 SYRINGE (ML) INJECTION EVERY 12 HOURS SCHEDULED
Status: DISCONTINUED | OUTPATIENT
Start: 2025-03-19 | End: 2025-03-19 | Stop reason: HOSPADM

## 2025-03-19 RX ORDER — PROCHLORPERAZINE EDISYLATE 5 MG/ML
5 INJECTION INTRAMUSCULAR; INTRAVENOUS
Status: DISCONTINUED | OUTPATIENT
Start: 2025-03-19 | End: 2025-03-19 | Stop reason: HOSPADM

## 2025-03-19 RX ORDER — SODIUM CHLORIDE 0.9 % (FLUSH) 0.9 %
5-40 SYRINGE (ML) INJECTION PRN
Status: DISCONTINUED | OUTPATIENT
Start: 2025-03-19 | End: 2025-03-19 | Stop reason: HOSPADM

## 2025-03-19 RX ORDER — MIDAZOLAM HYDROCHLORIDE 1 MG/ML
INJECTION, SOLUTION INTRAMUSCULAR; INTRAVENOUS
Status: DISCONTINUED | OUTPATIENT
Start: 2025-03-19 | End: 2025-03-19 | Stop reason: SDUPTHER

## 2025-03-19 RX ORDER — HYDROMORPHONE HYDROCHLORIDE 1 MG/ML
0.5 INJECTION, SOLUTION INTRAMUSCULAR; INTRAVENOUS; SUBCUTANEOUS EVERY 5 MIN PRN
Status: DISCONTINUED | OUTPATIENT
Start: 2025-03-19 | End: 2025-03-19 | Stop reason: HOSPADM

## 2025-03-19 RX ORDER — BUPIVACAINE HYDROCHLORIDE AND EPINEPHRINE 2.5; 5 MG/ML; UG/ML
INJECTION, SOLUTION EPIDURAL; INFILTRATION; INTRACAUDAL; PERINEURAL PRN
Status: DISCONTINUED | OUTPATIENT
Start: 2025-03-19 | End: 2025-03-19 | Stop reason: ALTCHOICE

## 2025-03-19 RX ORDER — IPRATROPIUM BROMIDE AND ALBUTEROL SULFATE 2.5; .5 MG/3ML; MG/3ML
1 SOLUTION RESPIRATORY (INHALATION)
Status: DISCONTINUED | OUTPATIENT
Start: 2025-03-19 | End: 2025-03-19 | Stop reason: HOSPADM

## 2025-03-19 RX ORDER — CEPHALEXIN 500 MG/1
500 CAPSULE ORAL 4 TIMES DAILY
Qty: 3 CAPSULE | Refills: 0
Start: 2025-03-19

## 2025-03-19 RX ORDER — ONDANSETRON 2 MG/ML
INJECTION INTRAMUSCULAR; INTRAVENOUS
Status: DISCONTINUED | OUTPATIENT
Start: 2025-03-19 | End: 2025-03-19 | Stop reason: SDUPTHER

## 2025-03-19 RX ORDER — PROPOFOL 10 MG/ML
INJECTION, EMULSION INTRAVENOUS
Status: DISCONTINUED | OUTPATIENT
Start: 2025-03-19 | End: 2025-03-19 | Stop reason: SDUPTHER

## 2025-03-19 RX ORDER — ONDANSETRON 2 MG/ML
4 INJECTION INTRAMUSCULAR; INTRAVENOUS
Status: DISCONTINUED | OUTPATIENT
Start: 2025-03-19 | End: 2025-03-19 | Stop reason: HOSPADM

## 2025-03-19 RX ORDER — NALOXONE HYDROCHLORIDE 0.4 MG/ML
INJECTION, SOLUTION INTRAMUSCULAR; INTRAVENOUS; SUBCUTANEOUS PRN
Status: DISCONTINUED | OUTPATIENT
Start: 2025-03-19 | End: 2025-03-19 | Stop reason: HOSPADM

## 2025-03-19 RX ADMIN — MIDAZOLAM 2 MG: 1 INJECTION INTRAMUSCULAR; INTRAVENOUS at 13:06

## 2025-03-19 RX ADMIN — FENTANYL CITRATE 25 MCG: 50 INJECTION, SOLUTION INTRAMUSCULAR; INTRAVENOUS at 14:10

## 2025-03-19 RX ADMIN — FENTANYL CITRATE 25 MCG: 50 INJECTION, SOLUTION INTRAMUSCULAR; INTRAVENOUS at 13:17

## 2025-03-19 RX ADMIN — ONDANSETRON 4 MG: 2 INJECTION, SOLUTION INTRAMUSCULAR; INTRAVENOUS at 14:10

## 2025-03-19 RX ADMIN — FENTANYL CITRATE 25 MCG: 50 INJECTION, SOLUTION INTRAMUSCULAR; INTRAVENOUS at 13:35

## 2025-03-19 RX ADMIN — LIDOCAINE HYDROCHLORIDE 100 MG: 20 INJECTION, SOLUTION EPIDURAL; INFILTRATION; INTRACAUDAL; PERINEURAL at 13:12

## 2025-03-19 RX ADMIN — WATER 3000 MG: 1 INJECTION INTRAMUSCULAR; INTRAVENOUS; SUBCUTANEOUS at 13:17

## 2025-03-19 RX ADMIN — FENTANYL CITRATE 25 MCG: 50 INJECTION, SOLUTION INTRAMUSCULAR; INTRAVENOUS at 14:05

## 2025-03-19 RX ADMIN — PROPOFOL 200 MG: 10 INJECTION, EMULSION INTRAVENOUS at 13:12

## 2025-03-19 RX ADMIN — SODIUM CHLORIDE: 0.9 INJECTION, SOLUTION INTRAVENOUS at 11:04

## 2025-03-19 ASSESSMENT — PAIN SCALES - GENERAL
PAINLEVEL_OUTOF10: 3
PAINLEVEL_OUTOF10: 0
PAINLEVEL_OUTOF10: 3
PAINLEVEL_OUTOF10: 3

## 2025-03-19 NOTE — OP NOTE
[address]  [address]                            OPERATIVE REPORT      PATIENT NAME: NAT MEDINA                 : 1985  MED REC NO: [id number]                       ROOM: [facility]  ACCOUNT NO: [id number]                       ADMIT DATE: 2025  PROVIDER: Tito Peña III, MD    DATE OF SERVICE:  2025    PREOPERATIVE DIAGNOSES:  Left medial femoral condyle unstable osteochondritis dissecans.    POSTOPERATIVE DIAGNOSES:       1. Left medial femoral condyle unstable osteochondritis dissecans.     2. 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    PROCEDURES PERFORMED:  Surgical arthroscopy of the left knee with arthroscopic debridement and osteochondritis dissecans repair (2 to 3 mm x 20 mm Bio-Compression screws by [employer]).    SURGEON:  Tito Peña III, MD    ASSISTANT:  GILL Kelley    ANESTHESIA:  General.    ESTIMATED BLOOD LOSS:  10cc    SPECIMENS REMOVED:  none    INTRAOPERATIVE FINDINGS:  same     COMPLICATIONS:  None.    IMPLANTS:  2 3mm biocompression screws(PLLA)    INDICATIONS:  This is a 39-year-old  male, brought to the operating room today because of unstable classic location of medial femoral condyle osteochondritis dissecans.  The patient has had an MRI showing the unstable lesion consistent with his complaint.  It has measured about 11 mm by MRI.    DESCRIPTION OF PROCEDURE:  The patient was brought into the operating theater.  After adequate anesthesia, the left knee was prepped and draped in a typical sterile fashion.  The knee was instilled with 30 mL of a 0.25% Marcaine with 1:200,000 epinephrine.  Standard anterolateral and anteromedial portals were anesthetized as well.  The scope was placed laterally and the probe medially.  Findings at this time showed the medial compartment did have a classic location osteochondritis dissecans that had some subsidence to it.

## 2025-03-19 NOTE — PERIOP NOTE
TRANSFER - IN REPORT:    Verbal report received from OR nurse and CRNA on Trell Shepherd  being received from OR for routine post-op      Report consisted of patient's Situation, Background, Assessment and   Recommendations(SBAR).     Information from the following report(s) Nurse Handoff Report, Adult Overview, Surgery Report, Intake/Output, MAR, Recent Results, and Med Rec Status was reviewed with the receiving nurse.    Opportunity for questions and clarification was provided.      Assessment completed upon patient's arrival to unit and care assumed.

## 2025-03-19 NOTE — PERIOP NOTE
TRANSFER - OUT REPORT:    Verbal report given to Amanda MEYER RN on Trell Shepherd  being transferred to phase 2 for routine post-op       Report consisted of patient's Situation, Background, Assessment and   Recommendations(SBAR).     Information from the following report(s) Nurse Handoff Report, Adult Overview, Surgery Report, Intake/Output, MAR, Recent Results, and Med Rec Status was reviewed with the receiving nurse.           Lines:   Peripheral IV 03/19/25 Left Forearm (Active)   Site Assessment Clean, dry & intact 03/19/25 1430   Line Status Infusing 03/19/25 1430   Phlebitis Assessment No symptoms 03/19/25 1430   Infiltration Assessment 0 03/19/25 1430   Alcohol Cap Used No 03/19/25 1104   Dressing Status Clean, dry & intact;New dressing applied 03/19/25 1430   Dressing Type Transparent 03/19/25 1430   Dressing Intervention New 03/19/25 1104        Opportunity for questions and clarification was provided.      Patient transported with:  Registered Nurse

## 2025-03-19 NOTE — ANESTHESIA POSTPROCEDURE EVALUATION
Department of Anesthesiology  Postprocedure Note    Patient: Trell Shepherd  MRN: [id number]  YOB: 1985  Date of evaluation: 3/19/2025    Procedure Summary       Date: 03/19/25 Room / Location: [facility] / [facility] OR    Anesthesia Start: 1308 Anesthesia Stop: 1428    Procedure: LEFT KNEE ARTHROSCOPIC DRILLING FOR OSTEOCHONDRITIS DISSECANS WITH BONE GRAFTING INCLUDING INTERNAL FIXATION (Left: Knee) Diagnosis:       Osteochondritis dissecans of left knee      (Osteochondritis dissecans of left knee [M93.262])    Surgeons: Tito Peña III, MD Responsible Provider: Marek Atkinson DO    Anesthesia Type: General ASA Status: 2            Anesthesia Type: General    Marvin Phase I: Marvin Score: 10    Marvin Phase II: Marvin Score: 10    Anesthesia Post Evaluation    Patient location during evaluation: PACU  Patient participation: complete - patient participated  Level of consciousness: awake and alert  Pain score: 2  Airway patency: patent  Nausea & Vomiting: no nausea and no vomiting  Cardiovascular status: blood pressure returned to baseline  Respiratory status: acceptable  Hydration status: euvolemic  Multimodal analgesia pain management approach  Pain management: adequate    No notable events documented.

## 2025-03-19 NOTE — INTERVAL H&P NOTE
Update History & Physical    The patient's History and Physical of March 14, the surgery was reviewed with the patient and I examined the patient. There was no change. The surgical site was confirmed by the patient and me.     Plan: The risks, benefits, expected outcome, and alternative to the recommended procedure have been discussed with the patient. Patient understands and wants to proceed with the procedure.     Electronically signed by EVARISTO GONZALEZ III, MD on 3/19/2025 at 11:08 AM

## 2025-03-19 NOTE — PERIOP NOTE
TRANSFER - IN REPORT:    Verbal report received from KHADAR Palomares on Trell Shepherd  being received from PACU for routine post-op      Report consisted of patient's Situation, Background, Assessment and   Recommendations(SBAR).     Information from the following report(s) Nurse Handoff Report, Adult Overview, Surgery Report, Intake/Output, and MAR was reviewed with the receiving nurse.    Opportunity for questions and clarification was provided.      Assessment completed upon patient's arrival to unit and care assumed.

## 2025-03-19 NOTE — DISCHARGE INSTRUCTIONS
incision heal and protects it. Your doctor will tell you how to take care of this.  If you do not have tape on the incision, wash the area daily with warm, soapy water, and pat it dry. Don't use hydrogen peroxide or alcohol, which can slow healing.    When should you call for help?    Call your doctor now or seek immediate medical care if:    You are dizzy or lightheaded, or you feel like you may faint.     You have bleeding that starts again or gets worse, such as soaking one or more bandages over 2 to 4 hours, even after holding pressure on the area.         __________________________________________________________________________________________________________________________________

## 2025-03-19 NOTE — PERIOP NOTE
Reviewed PTA medication list with patient/caregiver and patient/caregiver denies any additional medications.     Patient admits to having a responsible adult care for them at home for at least 24 hours after surgery.    Patient encouraged to use gown warming system and informed that using said warming gown to regulate body temperature prior to a procedure has been shown to help reduce the risks of blood clots and infection.    Patient's pharmacy of choice verified and documented in PTA medication section.    Dual skin assessment & fall risk band verification completed with DAMON Farooq RN.

## (undated) DEVICE — SUTURE MONOCRYL SZ 3-0 L27IN ABSRB UD PS-2 3/8 CIR REV CUT NDL MCP427H

## (undated) DEVICE — Device

## (undated) DEVICE — 4.5 MM INCISOR PLUS STRAIGHT                                    BLADES, POWER/EP-1, VIOLET, PACKAGED                                    6 PER BOX, STERILE

## (undated) DEVICE — TUBE IRRIG L8IN LNG PT W/ CONN FOR PMP SYS REDEUCE

## (undated) DEVICE — PAD,ABDOMINAL,5"X9",ST,LF,25/BX: Brand: MEDLINE INDUSTRIES, INC.

## (undated) DEVICE — TUBING PMP L8FT LNG W/ CONN FOR AR-6400 REDEUCE

## (undated) DEVICE — SOLUTION IRRIGATION LR 3000 ML USP TITAN XL CONTAINER 4/CA

## (undated) DEVICE — NEEDLE HYPO 25GA L1.5IN BVL ORIENTED ECLIPSE

## (undated) DEVICE — GLOVE SURG SZ 8 L12IN THK75MIL DK GRN LTX FREE

## (undated) DEVICE — SYRINGE MED 30ML STD CLR PLAS LUERLOCK TIP N CTRL DISP

## (undated) DEVICE — GARMENT,MEDLINE,DVT,INT,CALF,MED, GEN2: Brand: MEDLINE

## (undated) DEVICE — GLOVE ORANGE PI 8   MSG9080

## (undated) DEVICE — GLOVE SURG SZ 75 L12IN FNGR THK79MIL GRN LTX FREE

## (undated) DEVICE — GLOVE SURG SZ 85 L12IN THK75MIL DK GRN LTX FREE

## (undated) DEVICE — APPLICATOR MEDICATED 26 CC SOLUTION HI LT ORNG CHLORAPREP

## (undated) DEVICE — STRIP,CLOSURE,WOUND,MEDI-STRIP,1/2X4: Brand: MEDLINE

## (undated) DEVICE — NEEDLE HYPO 18GA L1.5IN PNK POLYPR HUB S STL REG BVL STR